# Patient Record
Sex: FEMALE | Race: BLACK OR AFRICAN AMERICAN | NOT HISPANIC OR LATINO | Employment: FULL TIME | URBAN - METROPOLITAN AREA
[De-identification: names, ages, dates, MRNs, and addresses within clinical notes are randomized per-mention and may not be internally consistent; named-entity substitution may affect disease eponyms.]

---

## 2017-02-06 ENCOUNTER — HOSPITAL ENCOUNTER (EMERGENCY)
Facility: HOSPITAL | Age: 52
Discharge: HOME | End: 2017-02-06
Attending: EMERGENCY MEDICINE
Payer: MEDICAID

## 2017-02-06 VITALS
RESPIRATION RATE: 17 BRPM | OXYGEN SATURATION: 99 % | SYSTOLIC BLOOD PRESSURE: 159 MMHG | WEIGHT: 180.25 LBS | HEART RATE: 52 BPM | BODY MASS INDEX: 31.93 KG/M2 | TEMPERATURE: 98 F | DIASTOLIC BLOOD PRESSURE: 71 MMHG

## 2017-02-06 DIAGNOSIS — R10.9 ABDOMINAL DISCOMFORT: ICD-10-CM

## 2017-02-06 DIAGNOSIS — R79.89 ELEVATED TROPONIN: ICD-10-CM

## 2017-02-06 DIAGNOSIS — R11.2 NON-INTRACTABLE VOMITING WITH NAUSEA, UNSPECIFIED VOMITING TYPE: Primary | ICD-10-CM

## 2017-02-06 LAB
ALBUMIN SERPL BCP-MCNC: 3.3 G/DL
ALP SERPL-CCNC: 68 U/L
ALT SERPL W/O P-5'-P-CCNC: 15 U/L
ANION GAP SERPL CALC-SCNC: 9 MMOL/L
AST SERPL-CCNC: 16 U/L
BASOPHILS # BLD AUTO: 0.03 K/UL
BASOPHILS NFR BLD: 0.3 %
BILIRUB SERPL-MCNC: 0.3 MG/DL
BILIRUB UR QL STRIP: NEGATIVE
BUN SERPL-MCNC: 15 MG/DL
CALCIUM SERPL-MCNC: 8.8 MG/DL
CHLORIDE SERPL-SCNC: 108 MMOL/L
CLARITY UR REFRACT.AUTO: CLEAR
CO2 SERPL-SCNC: 25 MMOL/L
COLOR UR AUTO: YELLOW
CREAT SERPL-MCNC: 0.8 MG/DL
DIFFERENTIAL METHOD: ABNORMAL
EOSINOPHIL # BLD AUTO: 0.2 K/UL
EOSINOPHIL NFR BLD: 2 %
ERYTHROCYTE [DISTWIDTH] IN BLOOD BY AUTOMATED COUNT: 14 %
EST. GFR  (AFRICAN AMERICAN): >60 ML/MIN/1.73 M^2
EST. GFR  (NON AFRICAN AMERICAN): >60 ML/MIN/1.73 M^2
GLUCOSE SERPL-MCNC: 80 MG/DL
GLUCOSE UR QL STRIP: NEGATIVE
HCT VFR BLD AUTO: 38.5 %
HGB BLD-MCNC: 12.2 G/DL
HGB UR QL STRIP: ABNORMAL
KETONES UR QL STRIP: NEGATIVE
LEUKOCYTE ESTERASE UR QL STRIP: NEGATIVE
LYMPHOCYTES # BLD AUTO: 3.8 K/UL
LYMPHOCYTES NFR BLD: 42.3 %
MCH RBC QN AUTO: 26 PG
MCHC RBC AUTO-ENTMCNC: 31.7 %
MCV RBC AUTO: 82 FL
MONOCYTES # BLD AUTO: 0.7 K/UL
MONOCYTES NFR BLD: 7.8 %
NEUTROPHILS # BLD AUTO: 4.2 K/UL
NEUTROPHILS NFR BLD: 47.6 %
NITRITE UR QL STRIP: NEGATIVE
PH UR STRIP: 6 [PH] (ref 5–8)
PLATELET # BLD AUTO: 250 K/UL
PMV BLD AUTO: 10.1 FL
POTASSIUM SERPL-SCNC: 4.1 MMOL/L
PROT SERPL-MCNC: 6.9 G/DL
PROT UR QL STRIP: NEGATIVE
RBC # BLD AUTO: 4.69 M/UL
SODIUM SERPL-SCNC: 142 MMOL/L
SP GR UR STRIP: 1.02 (ref 1–1.03)
TROPONIN I SERPL DL<=0.01 NG/ML-MCNC: 0.13 NG/ML
TROPONIN I SERPL DL<=0.01 NG/ML-MCNC: 0.14 NG/ML
URN SPEC COLLECT METH UR: ABNORMAL
UROBILINOGEN UR STRIP-ACNC: <2 EU/DL
WBC # BLD AUTO: 8.94 K/UL

## 2017-02-06 PROCEDURE — 93010 ELECTROCARDIOGRAM REPORT: CPT | Mod: ,,, | Performed by: INTERNAL MEDICINE

## 2017-02-06 PROCEDURE — 93005 ELECTROCARDIOGRAM TRACING: CPT | Performed by: GENERAL PRACTICE

## 2017-02-06 PROCEDURE — 96374 THER/PROPH/DIAG INJ IV PUSH: CPT

## 2017-02-06 PROCEDURE — 81003 URINALYSIS AUTO W/O SCOPE: CPT

## 2017-02-06 PROCEDURE — 96361 HYDRATE IV INFUSION ADD-ON: CPT

## 2017-02-06 PROCEDURE — 85025 COMPLETE CBC W/AUTO DIFF WBC: CPT

## 2017-02-06 PROCEDURE — 84484 ASSAY OF TROPONIN QUANT: CPT | Mod: 91

## 2017-02-06 PROCEDURE — 99284 EMERGENCY DEPT VISIT MOD MDM: CPT | Mod: 25

## 2017-02-06 PROCEDURE — 63600175 PHARM REV CODE 636 W HCPCS: Performed by: NURSE PRACTITIONER

## 2017-02-06 PROCEDURE — 99900035 HC TECH TIME PER 15 MIN (STAT): Performed by: GENERAL PRACTICE

## 2017-02-06 PROCEDURE — 25000003 PHARM REV CODE 250: Performed by: NURSE PRACTITIONER

## 2017-02-06 PROCEDURE — 80053 COMPREHEN METABOLIC PANEL: CPT

## 2017-02-06 RX ORDER — OMEPRAZOLE 20 MG/1
20 CAPSULE, DELAYED RELEASE ORAL DAILY
COMMUNITY
End: 2017-09-14

## 2017-02-06 RX ORDER — ONDANSETRON 2 MG/ML
4 INJECTION INTRAMUSCULAR; INTRAVENOUS
Status: COMPLETED | OUTPATIENT
Start: 2017-02-06 | End: 2017-02-06

## 2017-02-06 RX ORDER — ONDANSETRON 4 MG/1
4 TABLET, ORALLY DISINTEGRATING ORAL EVERY 8 HOURS PRN
Qty: 10 TABLET | Refills: 0 | Status: SHIPPED | OUTPATIENT
Start: 2017-02-06 | End: 2017-09-14

## 2017-02-06 RX ADMIN — ONDANSETRON 4 MG: 2 INJECTION INTRAMUSCULAR; INTRAVENOUS at 03:02

## 2017-02-06 RX ADMIN — SODIUM CHLORIDE 1000 ML: 0.9 INJECTION, SOLUTION INTRAVENOUS at 02:02

## 2017-02-06 NOTE — ED PROVIDER NOTES
Encounter Date: 2017       History     Chief Complaint   Patient presents with    Abdominal Pain     abd pain burning with vomiting since fri.      Review of patient's allergies indicates:  No Known Allergies    Patient is a 51 y.o. female presenting with the following complaint: abdominal pain. The history is provided by the patient.   Abdominal Pain   The current episode started several days ago. The onset of the illness was gradual. The abdominal pain is generalized. The severity of the abdominal pain is 6/10. The other symptoms of the illness include nausea and vomiting. The other symptoms of the illness do not include fever, fatigue, shortness of breath, diarrhea or dysuria.   Nausea began 3 to 5 days ago.   The vomiting began more than 2 days ago. Vomiting occurs 2 to 5 times per day. The emesis contains stomach contents.   The patient states that she believes she is currently not pregnant. Additional symptoms associated with the illness include heartburn. Symptoms associated with the illness do not include chills, constipation, urgency, frequency or back pain. Significant associated medical issues include GERD and cardiac disease.       PCP:              Li Vizcaino MD  Cardiologist:   Darryl Clemente MD      Past Medical History   Diagnosis Date    GERD (gastroesophageal reflux disease)     High cholesterol     Hypertension     MI (myocardial infarction)     Migraine headache     Stroke      No past medical history pertinent negatives.  Past Surgical History   Procedure Laterality Date    Hysterectomy       section       x 2     History reviewed. No pertinent family history.  Social History   Substance Use Topics    Smoking status: Never Smoker    Smokeless tobacco: Never Used    Alcohol use Yes      Comment: occasionally       Review of Systems   Constitutional: Negative for chills, fatigue and fever.   HENT: Negative for congestion and sore throat.    Eyes: Negative for visual  disturbance.   Respiratory: Negative for cough, chest tightness and shortness of breath.    Cardiovascular: Negative for chest pain and palpitations.   Gastrointestinal: Positive for abdominal pain, heartburn, nausea and vomiting. Negative for abdominal distention, constipation and diarrhea.   Genitourinary: Negative for dysuria, frequency and urgency.   Musculoskeletal: Negative for back pain.   Skin: Negative for rash.   Neurological: Negative for dizziness, weakness and headaches.   Hematological: Does not bruise/bleed easily.       Physical Exam   Initial Vitals   BP Pulse Resp Temp SpO2   02/06/17 1306 02/06/17 1306 02/06/17 1306 02/06/17 1306 02/06/17 1306   133/66 55 20 97.5 °F (36.4 °C) 100 %     Physical Exam    Nursing note and vitals reviewed.  Constitutional: She appears well-developed and well-nourished. She is cooperative. She does not appear ill. No distress.   HENT:   Head: Normocephalic and atraumatic.   Nose: Nose normal.   Mouth/Throat: Uvula is midline, oropharynx is clear and moist and mucous membranes are normal.   Eyes: Conjunctivae, EOM and lids are normal. Pupils are equal, round, and reactive to light.   Neck: Trachea normal and normal range of motion. Neck supple.   Cardiovascular: Regular rhythm, intact distal pulses and normal pulses. Bradycardia present.    Pulmonary/Chest: Effort normal and breath sounds normal. No accessory muscle usage. No respiratory distress. She has no wheezes. She has no rhonchi. She has no rales.   Abdominal: Soft. She exhibits no distension and no mass. Bowel sounds are increased. There is tenderness in the epigastric area. There is no rebound and no guarding.   Musculoskeletal: Normal range of motion. She exhibits no edema or tenderness.   Neurological: She is alert and oriented to person, place, and time. She has normal strength. No cranial nerve deficit or sensory deficit. GCS eye subscore is 4. GCS verbal subscore is 5. GCS motor subscore is 6.    Neurovascular intact to all extremities. Normal gait.    Skin: Skin is warm, dry and intact. No rash noted.   Normal color and turgor.    Psychiatric: She has a normal mood and affect. Her speech is normal and behavior is normal. Judgment and thought content normal. Cognition and memory are normal.         ED Course   Procedures    ED Lab Results:   Results for orders placed or performed during the hospital encounter of 02/06/17   CBC W/ AUTO DIFFERENTIAL   Result Value Ref Range    WBC 8.94 3.90 - 12.70 K/uL    RBC 4.69 4.00 - 5.40 M/uL    Hemoglobin 12.2 12.0 - 16.0 g/dL    Hematocrit 38.5 37.0 - 48.5 %    MCV 82 82 - 98 fL    MCH 26.0 (L) 27.0 - 31.0 pg    MCHC 31.7 (L) 32.0 - 36.0 %    RDW 14.0 11.5 - 14.5 %    Platelets 250 150 - 350 K/uL    MPV 10.1 9.2 - 12.9 fL    Gran # 4.2 1.8 - 7.7 K/uL    Lymph # 3.8 1.0 - 4.8 K/uL    Mono # 0.7 0.3 - 1.0 K/uL    Eos # 0.2 0.0 - 0.5 K/uL    Baso # 0.03 0.00 - 0.20 K/uL    Gran% 47.6 38.0 - 73.0 %    Lymph% 42.3 18.0 - 48.0 %    Mono% 7.8 4.0 - 15.0 %    Eosinophil% 2.0 0.0 - 8.0 %    Basophil% 0.3 0.0 - 1.9 %    Differential Method Automated    Comp. Metabolic Panel   Result Value Ref Range    Sodium 142 136 - 145 mmol/L    Potassium 4.1 3.5 - 5.1 mmol/L    Chloride 108 95 - 110 mmol/L    CO2 25 23 - 29 mmol/L    Glucose 80 70 - 110 mg/dL    BUN, Bld 15 6 - 20 mg/dL    Creatinine 0.8 0.5 - 1.4 mg/dL    Calcium 8.8 8.7 - 10.5 mg/dL    Total Protein 6.9 6.0 - 8.4 g/dL    Albumin 3.3 (L) 3.5 - 5.2 g/dL    Total Bilirubin 0.3 0.1 - 1.0 mg/dL    Alkaline Phosphatase 68 55 - 135 U/L    AST 16 10 - 40 U/L    ALT 15 10 - 44 U/L    Anion Gap 9 8 - 16 mmol/L    eGFR if African American >60.0 >60 mL/min/1.73 m^2    eGFR if non African American >60.0 >60 mL/min/1.73 m^2   Urinalysis - Clean Catch   Result Value Ref Range    Specimen UA Urine, Clean Catch     Color, UA Yellow Yellow, Straw, Rachel    Appearance, UA Clear Clear    pH, UA 6.0 5.0 - 8.0    Specific Gravity, UA 1.025  1.005 - 1.030    Protein, UA Negative Negative    Glucose, UA Negative Negative    Ketones, UA Negative Negative    Bilirubin (UA) Negative Negative    Occult Blood UA Trace (A) Negative    Nitrite, UA Negative Negative    Urobilinogen, UA <2.0 <2.0 EU/dL    Leukocytes, UA Negative Negative   Troponin I   Result Value Ref Range    Troponin I 0.126 (H) 0.000 - 0.026 ng/mL   Troponin I   Result Value Ref Range    Troponin I 0.137 (H) 0.000 - 0.026 ng/mL       ED Medications:   Medications   sodium chloride 0.9% bolus 1,000 mL (0 mLs Intravenous Stopped 2/6/17 1700)   ondansetron injection 4 mg (4 mg Intravenous Given 2/6/17 1501)       ED Course Vitals  Vitals:    02/06/17 1306 02/06/17 1722 02/06/17 1931   BP: 133/66 131/68 (!) 159/71   BP Location:  Left arm Left arm   Patient Position:  Sitting Sitting   BP Method:  Automatic Automatic   Pulse: (!) 55 (!) 56 (!) 52   Resp: 20 16 17   Temp: 97.5 °F (36.4 °C) 98.1 °F (36.7 °C) 98.3 °F (36.8 °C)   TempSrc: Oral Oral Oral   SpO2: 100% 100% 99%   Weight: 81.8 kg (180 lb 4 oz)         1530 HOURS RE-EVALUATION: The patient is resting comfortably and in NAD. She states that her nausea has resolved and that her pain is resolving.  She rates her current level of pain as a 2/10.  Updated patient on case.  Answered questions at this time.      1910 HOURS CARDIOLOGY CONSULT: Discussed case with Dr. Jordon Wolf (on call for Dr. Darryl Clemente), who agrees with the treatment plan and recommends that the patient follow up with Dr. Clemente this week for further evaluation and treatment.  He states that her last cardiac workup was completely negative and has no concern for the elevated troponin at this time.       1925 HOURS RE-EVALUATION & DISPOSITION:   Reassessment at the time of disposition demonstrates that the patient is resting comfortably in no acute distress.  She has remained hemodynamically stable throughout the entire ED visit and is without objective evidence for acute  process requiring urgent intervention or hospitalization. I discussed test results and provided counseling to patient with regard to condition, the treatment plan, specific conditions for return, and the importance of follow up.  Answered questions at this time. The patient is stable for discharge.         EKG Readings: (Independently Interpreted)   Initial Reading: No STEMI. Rhythm: Sinus Bradycardia. Heart Rate: 52. Ectopy: No Ectopy. Conduction: Normal. ST Segments: Normal ST Segments. T Waves: Normal. Axis: Normal. Clinical Impression: Sinus Bradycardia          Medical Decision Making:   History:   Old Records Summarized: records from clinic visits.  Clinical Tests:   Lab Tests: Ordered and Reviewed  Medical Tests: Ordered and Reviewed  Other:   I have discussed this case with another health care provider.       <> Summary of the Discussion: Attending Physician:   Mary Ellen Reyes DO  Cardiologist:   Jordon Wolf MD (on call for Dr. Darryl Clemente)                     Clinical Impression:       ICD-10-CM ICD-9-CM   1. Non-intractable vomiting with nausea, unspecified vomiting type R11.2 787.01   2. Abdominal discomfort R10.9 789.00   3. Elevated troponin R79.89 790.6         Disposition:   Disposition: Discharged  Condition: Stable  I discussed with patient that the evaluation in the emergency department does not suggest any emergent or life threatening medical condition requiring immediate intervention beyond what was provided in the ED, and I believe patient is safe for discharge.  Regardless, an unremarkable evaluation in the ED does not preclude the development or presence of a serious of life threatening condition. As such, patient was instructed to return immediately for any worsening or change in current symptoms. I also discussed the results of my evaluation and diagnosis with patient and she concurs with the evaluation and management plan.  Detailed written and verbal instructions provided to patient  and she expressed a verbal understanding of the discharge instructions and overall management plan. Reiterated the importance of medication administration and safety and advised patient to follow up with primary care provider in 3-5 days or sooner if needed.  Also advised patient to return to the ER for any complications.     Regarding ABDOMINAL PAIN, I recommended that the patient: Sip water or other clear fluids; avoid solid food for the first few hours after vomiting or diarrhea; if vomiting, wait 6 hours, and then eat small amounts of mild foods such as rice, applesauce, or crackers; avoid dairy products; avoid citrus, high-fat foods, fried or greasy foods, tomato products, caffeine, alcohol, and carbonated beverages;  avoid aspirin, ibuprofen or other anti-inflammatory medications, and narcotic pain medications unless prescribed.  In regards to prevention, I encouraged patient to:  Avoid fatty or greasy foods; drink plenty of water each day; eat small meals more frequently; exercise regularly; limit foods that produce gas; make sure meals are well-balanced and high in fiber and include plenty of fruits and vegetables.    For NAUSEA/VOMITING, I advised patient on importance of staying well hydrated; eating frequent, small amounts of clear liquids; avoid solid foods until there has been no vomiting for six hours, and then work slowly back to a normal diet; and to use an OTC bismuth stomach remedy for upset stomach, nausea, indigestion, and diarrhea. We discussed signs and symptoms of dehydration and possible causes of N/V with patient (viral infections, medications, migraine headaches, food poisoning, allergies, and peptic ulcer disease).  Reiterated the importance of following up with primary care provider if no improvement is noted within 72 hours.       Discharge Medication List as of 2/6/2017  7:23 PM      START taking these medications    Details   ondansetron (ZOFRAN-ODT) 4 MG TbDL Take 1 tablet (4 mg  total) by mouth every 8 (eight) hours as needed., Starting 2/6/2017, Until Discontinued, Normal             Follow-up Information     Schedule an appointment as soon as possible for a visit with Li Vizcaino MD.    Specialty:  Internal Medicine    Why:  If symptoms worsen or as needed    Contact information:    [address] [facility] [id number] [phone]          Schedule an appointment as soon as possible for a visit with Darryl Clemente MD.    Specialty:  Cardiology    Why:  You should expect a call tomorrow to schedule an appointment as soon as possible for a follow up visit             Zaid Santacruz NP  02/18/17 [id number]

## 2017-02-06 NOTE — ED AVS SNAPSHOT
[facility]  [address] [address] [address]               Scooter Ochoa   2017  1:53 PM   ED    Description:  Female : 1965   Department:  [facility]           Your Care was Coordinated By:     Provider Role From To    Zaid Santacruz NP Nurse Practitioner [phone] --      Reason for Visit     Abdominal Pain           Diagnoses this Visit        Comments    Non-intractable vomiting with nausea, unspecified vomiting type    -  Primary     Abdominal discomfort         Elevated troponin           ED Disposition     ED Disposition Condition Comment    Discharge  1)  Follow up with Dr. Darryl Clemente this week (you should receive a phone call from his office to have an appointment scheduled).      2)  Your prescription for Zofran was sent electronically to [facility].            To Do List           Follow-up Information     Schedule an appointment as soon as possible for a visit with Li Vizcaino MD.    Specialty:  Internal Medicine    Why:  If symptoms worsen or as needed    Contact information:    [address] [place] [id number] [phone]          Schedule an appointment as soon as possible for a visit with Darryl Clemente MD.    Specialty:  Cardiology    Why:  You should expect a call tomorrow to schedule an appointment as soon as possible for a follow up visit       These Medications        Disp Refills Start End    ondansetron (ZOFRAN-ODT) 4 MG TbDL 10 tablet 0 2017     Take 1 tablet (4 mg total) by mouth every 8 (eight) hours as needed. - Oral    Pharmacy: [facility] [address] [place] [address] [id number] [place] [address] Ph #: [phone]         [facility]     [facility] -  Assistance  Registered nurses in the [facility] provide clinical advisement, health education, appointment booking, and other advisory services.  Call for this free service at [phone].              Medications           Message regarding Medications     Verify the changes and/or additions to your medication regime listed below are the same as discussed with your clinician today.  If any of these changes or additions are incorrect, please notify your healthcare provider.        START taking these NEW medications        Refills    ondansetron (ZOFRAN-ODT) 4 MG TbDL 0    Sig: Take 1 tablet (4 mg total) by mouth every 8 (eight) hours as needed.    Class: Normal    Route: Oral      These medications were administered today        Dose Freq    sodium chloride 0.9% bolus 1,000 mL 1,000 mL Once    Sig: Inject 1,000 mLs into the vein once.    Class: Normal    Route: Intravenous    ondansetron injection 4 mg 4 mg ED 1 Time    Sig: Inject 4 mg into the vein ED 1 Time.    Class: Normal    Route: Intravenous      STOP taking these medications     levothyroxine (SYNTHROID) 75 MCG tablet Take 75 mcg by mouth once daily.    pantoprazole (PROTONIX) 40 MG tablet Take 40 mg by mouth once daily.    citalopram (CELEXA) 20 MG tablet Take 20 mg by mouth once daily.    metoprolol tartrate (LOPRESSOR) 25 MG tablet Take 0.5 tablets (12.5 mg total) by mouth 2 (two) times daily.    mupirocin (BACTROBAN) 2 % ointment Apply topically to affected area three times daily.    naproxen (NAPROSYN) 375 MG tablet Take 1 tablet (375 mg total) by mouth 2 (two) times daily as needed (Pain).           Verify that the below list of medications is an accurate representation of the medications you are currently taking.  If none reported, the list may be blank. If incorrect, please contact your healthcare provider. Carry this list with you in case of emergency.           Current Medications     aspirin (ECOTRIN) 81 MG EC tablet Take 1 tablet (81 mg total) by mouth once daily.    atorvastatin (LIPITOR) 20 MG tablet Take 1 tablet (20 mg total) by mouth once daily.    lisinopril 10 MG tablet Take 10 mg by mouth once daily.    omeprazole (PRILOSEC) 20 MG  capsule Take 20 mg by mouth once daily.    CALCIUM CARBONATE/VITAMIN D3 (VITAMIN D-3 ORAL) Take by mouth.    cyanocobalamin, vitamin B-12, 50 mcg tablet Take by mouth.    ondansetron (ZOFRAN-ODT) 4 MG TbDL Take 1 tablet (4 mg total) by mouth every 8 (eight) hours as needed.           Clinical Reference Information           Your Vitals Were     BP Pulse Temp Resp Weight SpO2    131/68 (BP Location: Left arm, Patient Position: Sitting, BP Method: Automatic) 56 98.1 °F (36.7 °C) (Oral) 16 81.8 kg (180 lb 4 oz) 100%    BMI                31.93 kg/m2          Allergies as of 2/6/2017     No Known Allergies      Immunizations Administered on Date of Encounter - 2/6/2017     None      ED Micro, Lab, POCT     Start Ordered       Status Ordering Provider    02/06/17 1819 02/06/17 1819  Troponin I  STAT     Comments:  Repeat Troponin    Final result     02/06/17 1444 02/06/17 1443  Troponin I  STAT      Final result     02/06/17 1437 02/06/17 1437  CBC W/ AUTO DIFFERENTIAL  STAT      Final result     02/06/17 1437 02/06/17 1437  Comp. Metabolic Panel  STAT      Final result     02/06/17 1437 02/06/17 1437  Urinalysis - Clean Catch  STAT      Final result       ED Imaging Orders     None        Discharge Instructions         Abdominal Pain  Abdominal pain is pain in the stomach or belly area. Everyone has this pain from time to time. In many cases it goes away on its own. But abdominal pain can sometimes be due to a serious problem, such as appendicitis. So its important to know when to seek help.  Causes of abdominal pain  There are many possible causes of abdominal pain. Common causes in adults include:  · Constipation, diarrhea, or gas  · Stomach acid flowing back up into the esophagus (acid reflux or heartburn)  · Severe acid reflux, called GERD (gastroesophageal reflux disease)  · A sore in the lining of the stomach or small intestine (peptic ulcer)  · Inflammation of the gallbladder, liver, or pancreas  · Gallstones or  kidney stones  · Appendicitis   · Intestinal blockage   · An internal organ pushing through a muscle or other tissue (hernia)  · Urinary tract infections  · In women, menstrual cramps, fibroids, or endometriosis  · Inflammation or infection of the intestines  Diagnosing the cause of abdominal pain  Your healthcare provider will do a physical exam help find the cause of your pain. If needed, tests will be ordered. Belly pain has many possible causes. So it can be hard to find the reason for your pain. Giving details about your pain can help. Tell your provider where and when you feel the pain, and what makes it better or worse. Also let your provider know if you have other symptoms such as:  · Fever  · Tiredness  · Upset stomach (nausea)  · Vomiting  · Changes in bathroom habits  Treating abdominal pain  Some causes of pain need emergency medical treatment right away. These include appendicitis or a bowel blockage. Other problems can be treated with rest, fluids, or medicines. Your healthcare provider can give you specific instructions for treatment or self-care based on what is causing your pain.  If you have vomiting or diarrhea, sip water or other clear fluids. When you are ready to eat solid foods again, start with small amounts of easy-to-digest, low-fat foods. These include apple sauce, toast, or crackers.   When to seek medical care  Call 911 or go to the hospital right away if you:  · Cant pass stool and are vomiting  · Are vomiting blood or have bloody diarrhea or black, tarry diarrhea  · Have chest, neck, or shoulder pain  · Feel like you might pass out  · Have pain in your shoulder blades with nausea  · Have sudden, severe belly pain  · Have new, severe pain unlike any you have felt before  · Have a belly that is rigid, hard, and tender to touch  Call your healthcare provider if you have:  · Pain for more than 5 days  · Bloating for more than 2 days  · Diarrhea for more than 5 days  · A fever of 100.4°F  "(38.0°C) or higher, or as directed by your provider  · Pain that gets worse  · Weight loss for no reason  · Continued lack of appetite  · Blood in your stool  How to prevent abdominal pain  Here are some tips to help prevent abdominal pain:  · Eat smaller amounts of food at one time.  · Avoid greasy, fried, or other high-fat foods.  · Avoid foods that give you gas.  · Exercise regularly.  · Drink plenty of fluids.  To help prevent GERD symptoms:  · Quit smoking.  · Reduce alcohol and certain foods that increase stomach acid.  · Avoid aspirin and over-the-counter pain and fever medicines (NSAIDS or nonsteroidal anti-inflammatory drugs), if possible  · Lose extra weight.  · Finish eating at least 2 hours before you go to bed or lie down.  · Raise the head of your bed.          Vomiting (Adult)  Vomiting is a common symptom that may be due to different causes. These include gastroenteritis ("stomach flu"), food poisoning and gastritis. There are other more serious causes of vomiting which may be hard to diagnose early in the illness. Therefore, it is important to watch for the warning signs listed below.  The main danger from repeated vomiting is dehydration. This is due to excess loss of water and minerals from the body. When this occurs, body fluids must be replaced.  Home care  · If symptoms are severe, rest at home for the next 24 hours.  · Because your symptoms may be from an infection, wash your hands frequently and well, and use alcohol-based  to avoid spreading the infection to others.  · Wash your hands for at least 20 seconds. Hum the happy birthday song twice for the correct length of time.  · Wash your hands after using the toilet, before and after preparing food, before eating food, after changing a diaper, cleaning a wound, caring for a sick person, and blowing your nose, coughing, or sneezing. You should also wash your hands after caring for someone who is sick, touching pet food, or treats, and " touching an animal, or animal waste.  · You may use acetaminophen or NSAID medicines like ibuprofen or naproxen to control fever, unless another medicine was prescribed. If you have chronic liver or kidney disease or ever had a stomach ulcer or GI bleeding, talk with your doctor before using these medicines. Aspirin should never be used in anyone under 18 years of age who is ill with a fever. It may cause severe liver damage. Don't use NSAID medicines if you are already taking one for another condition (like arthritis) or are on aspirin (such as for heart disease, or after a stroke)  · Avoid tobacco and alcohol use, which may worsen your symptoms.  · If medicines for vomiting were prescribed, take as directed.  · Once vomiting stops, then follow these guidelines:  During the first 12 to 24 hours follow the diet below:  · Fruit juices. Apple, grape juice, clear fruit drinks, and electrolyte replacement drinks.  · Beverages. Soft drinks without caffeine; mineral water (plain or flavored), decaffeinated tea and coffee.  · Soups. Clear broth, consommé and bouillon  · Desserts. Plain gelatin, popsicles and fruit juice bars. As you feel better, you may add 6-8 ounces of yogurt per day.  During the next 24 hours you may add the following to the above:  · Hot cereal, plain toast, bread, rolls, crackers  · Plain noodles, rice, mashed potatoes, chicken noodle or rice soup  · Unsweetened canned fruit (avoid pineapple), bananas  · Limit caffeine and chocolate. No spices or seasonings except salt.  During the next 24 hours:  Gradually resume a normal diet, as you feel better and your symptoms lessen.  Follow-up care  Follow up with your healthcare provider, or as advised.  When to seek medical advice  Call your healthcare provider right away if any of these occur:  · Constant right-sided lower abdominal pain or increasing general abdominal pain  · Continued vomiting (unable to keep liquids down) for 24 hours  · Frequent diarrhea  (more than 5 times a day); blood (red or black color) or mucus in diarrhea  · Reduced urine output or extreme thirst  · Weakness, dizziness or fainting  · Unusually drowsy or confused  · Fever of 100.4°F (38°C) oral or higher, or as directed  · Yellow color of the eyes or skin        Troponin  Does this test have other names?  Cardiac troponin (cTn), cardiac troponin I (cTnI), cardiac troponin T (cTnT)  What is this test?  This test measures the amount of the protein troponin in your blood.  Troponin is found in cells in your heart muscle. When these cells are injured--most often because the heart isn't getting enough oxygen and nutrients--they can release troponin and other substances into the blood.  Measuring your levels of troponin often can quickly tell your healthcare provider whether you are having a heart attack. During a heart attack, an artery that feeds your heart muscle with blood becomes blocked.  Why do I need this test?  You might have this test if your healthcare provider suspects that you are having a heart attack. Symptoms of a heart attack often include:  · Pain or discomfort in the chest that may feel like a squeezing sensation or a sense of fullness  · Pain in other areas, such as the neck, back, arm, or jaw  · Shortness of breath  · Lightheadedness or dizziness  · Nausea or vomiting  · Sudden sweating  · Extreme tiredness  What other tests might I have along with this test?  Your healthcare provider may also order other tests to diagnose a heart attack and learn more about how it's affecting the heart. These tests often include:  · Electrocardiogram (ECG) to measure the heart's electrical activity  · Blood tests to measure creatine kinase MB, a substance found in heart muscle and other tissues  What do my test results mean?  Many things may affect your lab test results. These include the method each lab uses to do the test. Even if your test results are different from the normal value, you may  not have a problem. To learn what the results mean for you, talk with your healthcare provider.  Results are given in nanograms per milliliter (ng/mL). The normal range for troponin is between 0 and 0.4 ng/mL.  Other types of heart injury may cause a rise in troponin levels. These include:  · Atrial fibrillation  · Heart failure  · Myocarditis  · Damage to the heart from anthracycline medicines. These are used for cancer treatment.  Conditions in other parts of your body may cause troponin levels to rise. These include:  · Blood clot in your lungs (pulmonary embolism)  · Chronic kidney disease  · Chronic obstructive pulmonary disease (COPD)  How is this test done?  The test requires a blood sample, which is drawn through a needle from a vein in your arm.  Does this test pose any risks?  Taking a blood sample with a needle carries risks that include bleeding, infection, bruising, or feeling dizzy. When the needle pricks your arm, you may feel a slight stinging sensation or pain. Afterward, the site may be slightly sore.  What might affect my test results?  Having this test too soon after a heart attack may give a false-negative. Cardiac troponin takes a few hours to rise after heart-cell death begins. Your healthcare provider may need to measure it several times over a few hours after the symptoms start.      [facility] Sign-Up     Activating your [facility] account is as easy as 1-2-3!     1) Visit [email or web address], select Sign Up Now, enter this activation code and your date of birth, then select Next.  [email or web address]  Expires: 3/23/2017  7:22 PM      2) Create a username and password to use when you visit [facility] in the future and select a security question in case you lose your password and select Next.    3) Enter your e-mail address and click Sign Up!    Additional Information  If you have questions, please e-mail [email or web address] or call [phone] to talk to our [facility] staff. Remember, [facility] is  NOT to be used for urgent needs. For medical emergencies, dial 911.          [facility] complies with applicable Federal civil rights laws and does not discriminate on the basis of race, color, national origin, age, disability, or sex.        Language Assistance Services     ATTENTION: Language assistance services are available, free of charge. Please call [phone].      ATENCIÓN: Si habla español, tiene a johnson disposición servicios gratuitos de asistencia lingüística. Llame al [phone].     CHÚ Ý: N?u b?n nói Ti?ng Vi?t, có các d?ch v? h? tr? ngôn ng? mi?n phí dành cho b?n. G?i s? [phone].        Medications Administered     ondansetron injection 4 mg                  sodium chloride 0.9% bolus 1,000 mL                    Administrations This Visit        Admin Date Action                   ondansetron injection 4 mg 02/06/2017 Given                   Admin Date Action                   sodium chloride 0.9% bolus 1,000 mL 02/06/2017 New Bag                  Administrations This Visit     ondansetron injection 4 mg     Admin Date Action Dose Route Administered By             02/06/2017 Given 4 mg Intravenous Flavia Ho, RN                    sodium chloride 0.9% bolus 1,000 mL     Admin Date Action Dose Route Administered By             02/06/2017 New Bag 1000 mL Intravenous Flavia Ho, RN

## 2017-02-06 NOTE — Clinical Note
Follow up with Dr. Darryl Clemente this week (you should receive a phone call from his office to have an appointment scheduled).

## 2017-02-06 NOTE — ED NOTES
PT updated on plan of care. Lying in stretcher, NAD. No complaints or concerns at this time. Will continue to monitor.

## 2017-02-07 NOTE — ED NOTES
Level of Consciousness: Patient is awake, alert, oriented to person, place, time, and situation.    Appearance: Pt resting comfortably in stretcher, no acute distress at this time. Clothing appropriately placed and clean. Hygiene is appropriate.   Skin: Skin is warm, dry, and intact. Skin turgor is normal/elastic. Mucous membranes moist. Skin color is normal for ethnicity. No skin breakdown noted.  Musculoskeletal: Moves all extremities well. Full active ROM. No deformities noted. Denies any weakness. Gait steady, ambulates without use of assistive devices.   Respiratory: Airway open and patent. Respirations equal and unlabored. Breath sounds clear to auscultation. Denies any SOB.   Cardiac: Bradycardic. No peripheral edema noted. Radial and pedal pulses present and normal. Capillary refill is within normal limits. Denies chest pain.    GI: Denies any abdominal pain at this time.  Abdomen soft, non-tender to all quadrants with palpitation. Bowel sounds present and active in all quads. Abdomen symmetric with no distention noted. Denies any N/V/D at this time. No episodes of emesis since arrival to ED.    Neurological: Symmetrical expressions noted to face. Equal bilateral . Normal sensation reported to all extremities. No obvious neurological deficits noted.   Psychosocial: Speech spontaneous, clear, and coherent. Appropriate to situation. Pt is calm and cooperative.     Pt informed of plan of care, verbalizes understanding, and denies any other questions, complaints, or concerns at this time. Bed in locked in lowest position, siderails up x2, call light within reach. Will continue to monitor.

## 2017-02-07 NOTE — DISCHARGE INSTRUCTIONS
Abdominal Pain  Abdominal pain is pain in the stomach or belly area. Everyone has this pain from time to time. In many cases it goes away on its own. But abdominal pain can sometimes be due to a serious problem, such as appendicitis. So its important to know when to seek help.  Causes of abdominal pain  There are many possible causes of abdominal pain. Common causes in adults include:  · Constipation, diarrhea, or gas  · Stomach acid flowing back up into the esophagus (acid reflux or heartburn)  · Severe acid reflux, called GERD (gastroesophageal reflux disease)  · A sore in the lining of the stomach or small intestine (peptic ulcer)  · Inflammation of the gallbladder, liver, or pancreas  · Gallstones or kidney stones  · Appendicitis   · Intestinal blockage   · An internal organ pushing through a muscle or other tissue (hernia)  · Urinary tract infections  · In women, menstrual cramps, fibroids, or endometriosis  · Inflammation or infection of the intestines  Diagnosing the cause of abdominal pain  Your healthcare provider will do a physical exam help find the cause of your pain. If needed, tests will be ordered. Belly pain has many possible causes. So it can be hard to find the reason for your pain. Giving details about your pain can help. Tell your provider where and when you feel the pain, and what makes it better or worse. Also let your provider know if you have other symptoms such as:  · Fever  · Tiredness  · Upset stomach (nausea)  · Vomiting  · Changes in bathroom habits  Treating abdominal pain  Some causes of pain need emergency medical treatment right away. These include appendicitis or a bowel blockage. Other problems can be treated with rest, fluids, or medicines. Your healthcare provider can give you specific instructions for treatment or self-care based on what is causing your pain.  If you have vomiting or diarrhea, sip water or other clear fluids. When you are ready to eat solid foods again, start  "with small amounts of easy-to-digest, low-fat foods. These include apple sauce, toast, or crackers.   When to seek medical care  Call 911 or go to the hospital right away if you:  · Cant pass stool and are vomiting  · Are vomiting blood or have bloody diarrhea or black, tarry diarrhea  · Have chest, neck, or shoulder pain  · Feel like you might pass out  · Have pain in your shoulder blades with nausea  · Have sudden, severe belly pain  · Have new, severe pain unlike any you have felt before  · Have a belly that is rigid, hard, and tender to touch  Call your healthcare provider if you have:  · Pain for more than 5 days  · Bloating for more than 2 days  · Diarrhea for more than 5 days  · A fever of 100.4°F (38.0°C) or higher, or as directed by your provider  · Pain that gets worse  · Weight loss for no reason  · Continued lack of appetite  · Blood in your stool  How to prevent abdominal pain  Here are some tips to help prevent abdominal pain:  · Eat smaller amounts of food at one time.  · Avoid greasy, fried, or other high-fat foods.  · Avoid foods that give you gas.  · Exercise regularly.  · Drink plenty of fluids.  To help prevent GERD symptoms:  · Quit smoking.  · Reduce alcohol and certain foods that increase stomach acid.  · Avoid aspirin and over-the-counter pain and fever medicines (NSAIDS or nonsteroidal anti-inflammatory drugs), if possible  · Lose extra weight.  · Finish eating at least 2 hours before you go to bed or lie down.  · Raise the head of your bed.          Vomiting (Adult)  Vomiting is a common symptom that may be due to different causes. These include gastroenteritis ("stomach flu"), food poisoning and gastritis. There are other more serious causes of vomiting which may be hard to diagnose early in the illness. Therefore, it is important to watch for the warning signs listed below.  The main danger from repeated vomiting is dehydration. This is due to excess loss of water and minerals from the " body. When this occurs, body fluids must be replaced.  Home care  · If symptoms are severe, rest at home for the next 24 hours.  · Because your symptoms may be from an infection, wash your hands frequently and well, and use alcohol-based  to avoid spreading the infection to others.  · Wash your hands for at least 20 seconds. Hum the happy birthday song twice for the correct length of time.  · Wash your hands after using the toilet, before and after preparing food, before eating food, after changing a diaper, cleaning a wound, caring for a sick person, and blowing your nose, coughing, or sneezing. You should also wash your hands after caring for someone who is sick, touching pet food, or treats, and touching an animal, or animal waste.  · You may use acetaminophen or NSAID medicines like ibuprofen or naproxen to control fever, unless another medicine was prescribed. If you have chronic liver or kidney disease or ever had a stomach ulcer or GI bleeding, talk with your doctor before using these medicines. Aspirin should never be used in anyone under 18 years of age who is ill with a fever. It may cause severe liver damage. Don't use NSAID medicines if you are already taking one for another condition (like arthritis) or are on aspirin (such as for heart disease, or after a stroke)  · Avoid tobacco and alcohol use, which may worsen your symptoms.  · If medicines for vomiting were prescribed, take as directed.  · Once vomiting stops, then follow these guidelines:  During the first 12 to 24 hours follow the diet below:  · Fruit juices. Apple, grape juice, clear fruit drinks, and electrolyte replacement drinks.  · Beverages. Soft drinks without caffeine; mineral water (plain or flavored), decaffeinated tea and coffee.  · Soups. Clear broth, consommé and bouillon  · Desserts. Plain gelatin, popsicles and fruit juice bars. As you feel better, you may add 6-8 ounces of yogurt per day.  During the next 24 hours you may  add the following to the above:  · Hot cereal, plain toast, bread, rolls, crackers  · Plain noodles, rice, mashed potatoes, chicken noodle or rice soup  · Unsweetened canned fruit (avoid pineapple), bananas  · Limit caffeine and chocolate. No spices or seasonings except salt.  During the next 24 hours:  Gradually resume a normal diet, as you feel better and your symptoms lessen.  Follow-up care  Follow up with your healthcare provider, or as advised.  When to seek medical advice  Call your healthcare provider right away if any of these occur:  · Constant right-sided lower abdominal pain or increasing general abdominal pain  · Continued vomiting (unable to keep liquids down) for 24 hours  · Frequent diarrhea (more than 5 times a day); blood (red or black color) or mucus in diarrhea  · Reduced urine output or extreme thirst  · Weakness, dizziness or fainting  · Unusually drowsy or confused  · Fever of 100.4°F (38°C) oral or higher, or as directed  · Yellow color of the eyes or skin        Troponin  Does this test have other names?  Cardiac troponin (cTn), cardiac troponin I (cTnI), cardiac troponin T (cTnT)  What is this test?  This test measures the amount of the protein troponin in your blood.  Troponin is found in cells in your heart muscle. When these cells are injured--most often because the heart isn't getting enough oxygen and nutrients--they can release troponin and other substances into the blood.  Measuring your levels of troponin often can quickly tell your healthcare provider whether you are having a heart attack. During a heart attack, an artery that feeds your heart muscle with blood becomes blocked.  Why do I need this test?  You might have this test if your healthcare provider suspects that you are having a heart attack. Symptoms of a heart attack often include:  · Pain or discomfort in the chest that may feel like a squeezing sensation or a sense of fullness  · Pain in other areas, such as the neck,  back, arm, or jaw  · Shortness of breath  · Lightheadedness or dizziness  · Nausea or vomiting  · Sudden sweating  · Extreme tiredness  What other tests might I have along with this test?  Your healthcare provider may also order other tests to diagnose a heart attack and learn more about how it's affecting the heart. These tests often include:  · Electrocardiogram (ECG) to measure the heart's electrical activity  · Blood tests to measure creatine kinase MB, a substance found in heart muscle and other tissues  What do my test results mean?  Many things may affect your lab test results. These include the method each lab uses to do the test. Even if your test results are different from the normal value, you may not have a problem. To learn what the results mean for you, talk with your healthcare provider.  Results are given in nanograms per milliliter (ng/mL). The normal range for troponin is between 0 and 0.4 ng/mL.  Other types of heart injury may cause a rise in troponin levels. These include:  · Atrial fibrillation  · Heart failure  · Myocarditis  · Damage to the heart from anthracycline medicines. These are used for cancer treatment.  Conditions in other parts of your body may cause troponin levels to rise. These include:  · Blood clot in your lungs (pulmonary embolism)  · Chronic kidney disease  · Chronic obstructive pulmonary disease (COPD)  How is this test done?  The test requires a blood sample, which is drawn through a needle from a vein in your arm.  Does this test pose any risks?  Taking a blood sample with a needle carries risks that include bleeding, infection, bruising, or feeling dizzy. When the needle pricks your arm, you may feel a slight stinging sensation or pain. Afterward, the site may be slightly sore.  What might affect my test results?  Having this test too soon after a heart attack may give a false-negative. Cardiac troponin takes a few hours to rise after heart-cell death begins. Your  healthcare provider may need to measure it several times over a few hours after the symptoms start.

## 2017-09-14 ENCOUNTER — HOSPITAL ENCOUNTER (EMERGENCY)
Facility: HOSPITAL | Age: 52
Discharge: HOME | End: 2017-09-14
Payer: MEDICAID

## 2017-09-14 VITALS
WEIGHT: 183 LBS | DIASTOLIC BLOOD PRESSURE: 70 MMHG | RESPIRATION RATE: 20 BRPM | BODY MASS INDEX: 32.42 KG/M2 | OXYGEN SATURATION: 98 % | HEART RATE: 59 BPM | TEMPERATURE: 98 F | SYSTOLIC BLOOD PRESSURE: 149 MMHG

## 2017-09-14 DIAGNOSIS — M79.605 PAIN OF LEFT LOWER EXTREMITY: ICD-10-CM
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
## 2017-09-14 DIAGNOSIS — S86.912A MUSCLE STRAIN OF LOWER EXTREMITY, LEFT, INITIAL ENCOUNTER: Primary | ICD-10-CM

## 2017-09-14 PROCEDURE — 99283 EMERGENCY DEPT VISIT LOW MDM: CPT

## 2017-09-14 RX ORDER — CITALOPRAM 20 MG/1
20 TABLET, FILM COATED ORAL DAILY
COMMUNITY

## 2017-09-15 NOTE — ED NOTES
No swelling, deformity, erythema noted to posterior left thigh.  Denies increase in pain with palpation. Physical exam as per provider.

## 2017-09-15 NOTE — ED PROVIDER NOTES
History      Chief Complaint   Patient presents with    Fall     x 1 day ago; now has pain to left posterior thigh; denies LOC; currently ambulating with limp       Review of patient's allergies indicates:  No Known Allergies     HPI   HPI    2017, 7:39 PM   History obtained from the patient      History of Present Illness: Scooter Ochoa is a 51 y.o. female patient who presents to the Emergency Department for left leg pain following a fall one day ago.  Patient states that she fell in bathtub.  Rates pain 1-2/10.  No treatments tried.  Denies fever, vomiting, diarrhea, sore throat, cough, otalgia.        Arrival mode: Personal vehicle    PCP: Rosita Mcgrath MD       Past Medical History:  Past Medical History:   Diagnosis Date    GERD (gastroesophageal reflux disease)     High cholesterol     Hypertension     MI (myocardial infarction)     Stroke        Past Surgical History:  Past Surgical History:   Procedure Laterality Date    ANGIOPLASTY       SECTION      x 2    HYSTERECTOMY           Family History:  History reviewed. No pertinent family history.    Social History:  Social History     Social History Main Topics    Smoking status: Never Smoker    Smokeless tobacco: Never Used    Alcohol use Yes      Comment: occasionally    Drug use: No    Sexual activity: Yes     Partners: Male       ROS   Review of Systems   Constitutional: Negative for chills and fever.   HENT: Negative for congestion and rhinorrhea.    Respiratory: Negative for cough and wheezing.    Gastrointestinal: Negative for diarrhea and vomiting.   Musculoskeletal: Positive for arthralgias and myalgias.       Physical Exam      Initial Vitals [17]   BP Pulse Resp Temp SpO2   (!) 149/70 (!) 59 20 97.9 °F (36.6 °C) 98 %      MAP       96.33          Physical Exam  Nursing Notes and Vital Signs Reviewed.  Constitutional: Patient is in no apparent distress. Awake and alert. Well-developed and  well-nourished.  Head: Atraumatic. Normocephalic.  Eyes: PERRL. EOM intact. Conjunctivae are not pale. No scleral icterus.  ENT: Mucous membranes are moist. Oropharynx is clear and symmetric.    Neck: Supple. Full ROM. No lymphadenopathy.  Cardiovascular: Regular rate. Regular rhythm. No murmurs, rubs, or gallops. Distal pulses are 2+ and symmetric.  Pulmonary/Chest: No respiratory distress. Clear to auscultation bilaterally. No wheezing, rales, or rhonchi.  Abdominal: Soft and non-distended.  There is no tenderness.  No rebound, guarding, or rigidity. Good bowel sounds.  Genitourinary: No CVA tenderness  Musculoskeletal: Moves all extremities. No obvious deformities. No edema. No calf tenderness.  Left upper leg:  TTP posterior thigh.  Pain with flexion and extension of left leg.  No bruising or swelling noted.    Skin: Warm and dry.  Neurological:  Alert, awake, and appropriate.  Normal speech.  No acute focal neurological deficits are appreciated.  Psychiatric: Normal affect. Good eye contact. Appropriate in content.    ED Course    Procedures  ED Vital Signs:  Vitals:    09/14/17 1921 09/14/17 1923   BP:  (!) 149/70   Pulse:  (!) 59   Resp:  20   Temp:  97.9 °F (36.6 °C)   TempSrc:  Oral   SpO2:  98%   Weight: 83 kg (183 lb)        Abnormal Lab Results:  Labs Reviewed - No data to display     *    Imaging Results:  Imaging Results          X-Ray Femur Ap/Lat Left (Final result)  Result time 09/14/17 20:09:57    Final result by Jordon Neil MD (09/14/17 20:09:57)                 Impression:         Negative.      Electronically signed by: JORDON NEIL MD  Date:     09/14/17  Time:    20:09              Narrative:    Exam: XR FEMUR 2 VIEW LEFT,    Date:  09/14/17 20:04:53    History: Left lower extremity pain.  Fall.    Comparison:  No prior  studies available for comparison.    Findings:     No fracture or dislocation left femur.  Soft tissues are normal.                                      The  Emergency Provider reviewed the vital signs and test results, which are outlined above.    ED Discussion     8:15 PM:  Discussed with pt all pertinent ED information and results. Patient refused medications for pain;states she will take Tylenol or Ibuprofen as needed. Discussed pt dx  and plan of tx. Gave pt all f/u and return to the ED instructions. All questions and concerns were addressed at this time. Pt expresses understanding of information and instructions, and is comfortable with plan to discharge. Pt is stable for discharge.    I discussed with patient and/or family/caretaker that negative X-ray does not rule out occult fracture or other soft tissue injury.  Persistent pain greater than 7-10 days or increased pain requires follow up, specifically with orthopedics.     Pre-hypertension/Hypertension: The pt has been informed that they may have pre-hypertension or hypertension based on a blood pressure reading in the ED. I recommend that the pt call the PCP listed on their discharge instructions or a physician of their choice this week to arrange f/u for further evaluation of possible pre-hypertension or hypertension.       ED Medication(s):  Medications - No data to display    Current Discharge Medication List          Follow-up Information     Rosita Mcgrath MD In 3 days.    Specialty:  Internal Medicine  Contact information:  [address] [place] [id number] [phone]                     Medical Decision Making                  Clinical Impression       ICD-10-CM ICD-9-CM   1. Muscle strain of lower extremity, left, initial encounter S86.912A 844.9   2. Fall W19.XXXA E888.9   3. Pain of left lower extremity M79.605 729.5       Disposition:   Disposition: Discharged  Condition: Stable           Bere Coates PA-C  09/14/17 4314

## 2017-09-26 ENCOUNTER — HOSPITAL ENCOUNTER (EMERGENCY)
Facility: HOSPITAL | Age: 52
Discharge: HOME | End: 2017-09-26
Attending: EMERGENCY MEDICINE
Payer: MEDICAID

## 2017-09-26 VITALS
TEMPERATURE: 98 F | RESPIRATION RATE: 21 BRPM | DIASTOLIC BLOOD PRESSURE: 73 MMHG | SYSTOLIC BLOOD PRESSURE: 161 MMHG | WEIGHT: 182.38 LBS | BODY MASS INDEX: 32.32 KG/M2 | HEIGHT: 63 IN | HEART RATE: 49 BPM | OXYGEN SATURATION: 100 %

## 2017-09-26 DIAGNOSIS — Z86.73 HISTORY OF STROKE: ICD-10-CM

## 2017-09-26 DIAGNOSIS — I25.2 HISTORY OF MI (MYOCARDIAL INFARCTION): ICD-10-CM

## 2017-09-26 DIAGNOSIS — E78.5 HYPERLIPIDEMIA, UNSPECIFIED HYPERLIPIDEMIA TYPE: ICD-10-CM

## 2017-09-26 DIAGNOSIS — I10 ESSENTIAL HYPERTENSION: ICD-10-CM

## 2017-09-26 DIAGNOSIS — R79.89 ELEVATED TROPONIN: ICD-10-CM

## 2017-09-26 DIAGNOSIS — R07.9 CHEST PAIN, UNSPECIFIED TYPE: Primary | ICD-10-CM

## 2017-09-26 LAB
ALBUMIN SERPL BCP-MCNC: 3.5 G/DL
ALP SERPL-CCNC: 79 U/L
ALT SERPL W/O P-5'-P-CCNC: 12 U/L
ANION GAP SERPL CALC-SCNC: 9 MMOL/L
AST SERPL-CCNC: 15 U/L
BASOPHILS # BLD AUTO: 0.02 K/UL
BASOPHILS NFR BLD: 0.2 %
BILIRUB SERPL-MCNC: 0.2 MG/DL
BNP SERPL-MCNC: 18 PG/ML
BUN SERPL-MCNC: 12 MG/DL
CALCIUM SERPL-MCNC: 9.8 MG/DL
CHLORIDE SERPL-SCNC: 104 MMOL/L
CO2 SERPL-SCNC: 27 MMOL/L
CREAT SERPL-MCNC: 1 MG/DL
DIFFERENTIAL METHOD: ABNORMAL
EOSINOPHIL # BLD AUTO: 0.3 K/UL
EOSINOPHIL NFR BLD: 2.9 %
ERYTHROCYTE [DISTWIDTH] IN BLOOD BY AUTOMATED COUNT: 13.7 %
EST. GFR  (AFRICAN AMERICAN): >60 ML/MIN/1.73 M^2
EST. GFR  (NON AFRICAN AMERICAN): >60 ML/MIN/1.73 M^2
GLUCOSE SERPL-MCNC: 102 MG/DL
HCT VFR BLD AUTO: 37.9 %
HGB BLD-MCNC: 12.3 G/DL
LYMPHOCYTES # BLD AUTO: 3.7 K/UL
LYMPHOCYTES NFR BLD: 37.5 %
MCH RBC QN AUTO: 26.5 PG
MCHC RBC AUTO-ENTMCNC: 32.5 G/DL
MCV RBC AUTO: 82 FL
MONOCYTES # BLD AUTO: 0.8 K/UL
MONOCYTES NFR BLD: 7.9 %
NEUTROPHILS # BLD AUTO: 5 K/UL
NEUTROPHILS NFR BLD: 51.3 %
PLATELET # BLD AUTO: 275 K/UL
PMV BLD AUTO: 10.1 FL
POTASSIUM SERPL-SCNC: 4.1 MMOL/L
PROT SERPL-MCNC: 7.2 G/DL
RBC # BLD AUTO: 4.65 M/UL
SODIUM SERPL-SCNC: 140 MMOL/L
TROPONIN I SERPL DL<=0.01 NG/ML-MCNC: 0.08 NG/ML
WBC # BLD AUTO: 9.79 K/UL

## 2017-09-26 PROCEDURE — 99285 EMERGENCY DEPT VISIT HI MDM: CPT

## 2017-09-26 PROCEDURE — 85025 COMPLETE CBC W/AUTO DIFF WBC: CPT

## 2017-09-26 PROCEDURE — 84484 ASSAY OF TROPONIN QUANT: CPT

## 2017-09-26 PROCEDURE — 25000003 PHARM REV CODE 250: Performed by: EMERGENCY MEDICINE

## 2017-09-26 PROCEDURE — 80053 COMPREHEN METABOLIC PANEL: CPT

## 2017-09-26 PROCEDURE — 83880 ASSAY OF NATRIURETIC PEPTIDE: CPT

## 2017-09-26 RX ORDER — OMEPRAZOLE 40 MG/1
40 CAPSULE, DELAYED RELEASE ORAL DAILY
COMMUNITY

## 2017-09-26 RX ORDER — ASPIRIN 325 MG
325 TABLET ORAL
Status: COMPLETED | OUTPATIENT
Start: 2017-09-26 | End: 2017-09-26

## 2017-09-26 RX ADMIN — ASPIRIN 325 MG: 325 TABLET, COATED ORAL at 07:09

## 2017-09-27 NOTE — ED PROVIDER NOTES
Encounter Date: 2017       History     Chief Complaint   Patient presents with    Chest Pain     Right side chest pain, onset 30 mins prior to arrival accompanied by shortness of breath and nausea.      Patient currently presents with chief complaint of chest pain.  This began about 3 hours PTA.  This is localized to the substernal region.  Patient denies shortness of breath, denies palpitations,  denies nausea, denies diaphoresis.  Radiation of pain:  left shoulder  right shoulder.  Patient reports aspirin use in the last 24 hours (81mg daily). Patient reports history of known CAD with MI last yeat.  Cardiac risk factors include:  hyperlipidemia, hypertension, a history of coronary artery disease.          Review of patient's allergies indicates:  No Known Allergies  Past Medical History:   Diagnosis Date    GERD (gastroesophageal reflux disease)     High cholesterol     Hypertension     MI (myocardial infarction)     Stroke      Past Surgical History:   Procedure Laterality Date    ANGIOPLASTY       SECTION      x 2    HYSTERECTOMY       No family history on file.  Social History   Substance Use Topics    Smoking status: Never Smoker    Smokeless tobacco: Never Used    Alcohol use Yes      Comment: occasionally     Review of Systems   Constitutional: Negative for chills and fever.   HENT: Negative for congestion and rhinorrhea.    Respiratory: Negative for cough, chest tightness, shortness of breath and wheezing.    Cardiovascular: Positive for chest pain. Negative for palpitations and leg swelling.   Gastrointestinal: Negative for abdominal pain, constipation, diarrhea, nausea and vomiting.   Genitourinary: Negative for dysuria, frequency, urgency, vaginal bleeding and vaginal discharge.   Skin: Negative for color change and rash.   Allergic/Immunologic: Negative for immunocompromised state.   Neurological: Negative for dizziness, weakness and numbness.   Hematological: Negative for  adenopathy. Does not bruise/bleed easily.   All other systems reviewed and are negative.      Physical Exam     Initial Vitals [09/26/17 1858]   BP Pulse Resp Temp SpO2   (!) 173/69 (!) 54 20 97.4 °F (36.3 °C) 100 %      MAP       103.67         Physical Exam    Nursing note and vitals reviewed.  Constitutional: She appears well-developed and well-nourished. She is not diaphoretic. No distress.   HENT:   Head: Normocephalic and atraumatic.   Right Ear: External ear normal.   Left Ear: External ear normal.   Nose: Nose normal.   Mouth/Throat: Oropharynx is clear and moist.   Eyes: Conjunctivae and EOM are normal. Pupils are equal, round, and reactive to light. No scleral icterus.   Neck: Neck supple. No tracheal deviation present. No JVD present.   Cardiovascular: Normal rate, regular rhythm, normal heart sounds and intact distal pulses. Exam reveals no gallop and no friction rub.    No murmur heard.  Pulmonary/Chest: Breath sounds normal. No respiratory distress. She has no wheezes. She has no rhonchi. She has no rales.   Abdominal: Soft. Bowel sounds are normal. She exhibits no distension. There is no tenderness.   Musculoskeletal: Normal range of motion. She exhibits no edema.   Neurological: She is alert and oriented to person, place, and time. She has normal strength. No cranial nerve deficit or sensory deficit.   Skin: Skin is warm and dry. No rash noted.   Psychiatric: She has a normal mood and affect. Her behavior is normal.         ED Course   Procedures  Labs Reviewed   CBC W/ AUTO DIFFERENTIAL - Abnormal; Notable for the following:        Result Value    MCH 26.5 (*)     All other components within normal limits   TROPONIN I - Abnormal; Notable for the following:     Troponin I 0.079 (*)     All other components within normal limits   B-TYPE NATRIURETIC PEPTIDE   COMPREHENSIVE METABOLIC PANEL     EKG Readings: (Independently Interpreted)   Initial Reading: No STEMI. Rhythm: Sinus Bradycardia. Heart Rate:  48. Ectopy: No Ectopy. Conduction: Normal. Axis: Normal.     Imaging Results          X-Ray Chest AP Portable (Final result)  Result time 09/26/17 20:51:53    Final result by Jordon Estrada MD (09/26/17 20:51:53)                 Impression:         No acute process. No significant change from prior exam the.      Electronically signed by: JORDON ESTRADA MD  Date:     09/26/17  Time:    20:51              Narrative:    Exam: Chest X-ray, one view.    History: Chest pain    Findings: No infiltrate or effusion identified. Cardiomediastinal silhouette is within normal limits. Elevation left hemidiaphragm noted. No significant change from prior study dated 08/07/2016.                                 Medical Decision Making:   ED Management:  All historical, clinical, radiographic, and laboratory findings were reviewed with the patient/family in detail along with the indications for transfer to an outside facility (rather than admission to our facility in [place]) secondary to patient preference and a need for cardiac monitoring, serial troponin, and cardiology consult given the diagnosis of chest pain, elevated troponin, and prior MI.  All remaining questions and concerns were addressed at that time and the patient/family communicates understanding and agrees to proceed accordingly.  Similarly all pertinent details of the encounter were discussed with DR GALEANO at [facility] ED via  [place] who agrees to accept the patient in transfer based on the needs/patient preferences outlined above.  [facility] to a need for ongoing cardiac monitoring en route.  Omer Dias MD  12:23 AM                     ED Course      Clinical Impression:   The primary encounter diagnosis was Chest pain, unspecified type. Diagnoses of History of MI (myocardial infarction), Essential hypertension, Hyperlipidemia, unspecified hyperlipidemia type, Elevated troponin, and History of  stroke were also pertinent to this visit.                           Omer Dias MD  09/26/17 2104       Omer Dias MD  09/27/17 0021

## 2017-09-27 NOTE — ED NOTES
Transport set up with [facility] at this time. [facility]

## 2017-09-27 NOTE — ED NOTES
Informed by EMS dispatch that it would be approximately one and a half hours prior to arrival of ambulance for transport to [facility] for admission

## 2017-09-27 NOTE — ED NOTES
Pt requesting [facility] for admission. Spoke with [facility] house supervisor, who states their facility is at capacity and they cannot accept the patient at this time. Pt notified by Dr. Dias and patient now requests [facility] for admission.

## 2018-06-11 ENCOUNTER — HOSPITAL ENCOUNTER (EMERGENCY)
Facility: HOSPITAL | Age: 53
Discharge: HOME | End: 2018-06-11
Attending: EMERGENCY MEDICINE
Payer: MEDICAID

## 2018-06-11 VITALS
WEIGHT: 180 LBS | HEART RATE: 54 BPM | TEMPERATURE: 98 F | DIASTOLIC BLOOD PRESSURE: 57 MMHG | OXYGEN SATURATION: 100 % | SYSTOLIC BLOOD PRESSURE: 121 MMHG | RESPIRATION RATE: 18 BRPM | BODY MASS INDEX: 31.89 KG/M2

## 2018-06-11 DIAGNOSIS — K52.9 GASTROENTERITIS: Primary | ICD-10-CM

## 2018-06-11 DIAGNOSIS — R03.0 ELEVATED BLOOD PRESSURE READING WITHOUT DIAGNOSIS OF HYPERTENSION: ICD-10-CM

## 2018-06-11 LAB
ALBUMIN SERPL BCP-MCNC: 3.5 G/DL
ALP SERPL-CCNC: 67 U/L
ALT SERPL W/O P-5'-P-CCNC: 15 U/L
AMYLASE SERPL-CCNC: 98 U/L
ANION GAP SERPL CALC-SCNC: 8 MMOL/L
AST SERPL-CCNC: 21 U/L
BACTERIA #/AREA URNS AUTO: ABNORMAL /HPF
BASOPHILS # BLD AUTO: 0.02 K/UL
BASOPHILS NFR BLD: 0.2 %
BILIRUB SERPL-MCNC: 0.2 MG/DL
BILIRUB UR QL STRIP: NEGATIVE
BUN SERPL-MCNC: 15 MG/DL
CALCIUM SERPL-MCNC: 9.5 MG/DL
CHLORIDE SERPL-SCNC: 106 MMOL/L
CLARITY UR REFRACT.AUTO: CLEAR
CO2 SERPL-SCNC: 27 MMOL/L
COLOR UR AUTO: YELLOW
CREAT SERPL-MCNC: 0.9 MG/DL
DIFFERENTIAL METHOD: ABNORMAL
EOSINOPHIL # BLD AUTO: 0.2 K/UL
EOSINOPHIL NFR BLD: 2 %
ERYTHROCYTE [DISTWIDTH] IN BLOOD BY AUTOMATED COUNT: 14.3 %
EST. GFR  (AFRICAN AMERICAN): >60 ML/MIN/1.73 M^2
EST. GFR  (NON AFRICAN AMERICAN): >60 ML/MIN/1.73 M^2
GLUCOSE SERPL-MCNC: 138 MG/DL
GLUCOSE UR QL STRIP: NEGATIVE
HCT VFR BLD AUTO: 37.2 %
HGB BLD-MCNC: 12.2 G/DL
HGB UR QL STRIP: ABNORMAL
KETONES UR QL STRIP: NEGATIVE
LEUKOCYTE ESTERASE UR QL STRIP: NEGATIVE
LIPASE SERPL-CCNC: 109 U/L
LYMPHOCYTES # BLD AUTO: 3.3 K/UL
LYMPHOCYTES NFR BLD: 37 %
MCH RBC QN AUTO: 26.9 PG
MCHC RBC AUTO-ENTMCNC: 32.8 G/DL
MCV RBC AUTO: 82 FL
MICROSCOPIC COMMENT: ABNORMAL
MONOCYTES # BLD AUTO: 0.5 K/UL
MONOCYTES NFR BLD: 5.8 %
NEUTROPHILS # BLD AUTO: 5 K/UL
NEUTROPHILS NFR BLD: 54.8 %
NITRITE UR QL STRIP: NEGATIVE
PH UR STRIP: 7 [PH] (ref 5–8)
PLATELET # BLD AUTO: 229 K/UL
PMV BLD AUTO: 10.6 FL
POTASSIUM SERPL-SCNC: 4.4 MMOL/L
PROT SERPL-MCNC: 7.2 G/DL
PROT UR QL STRIP: NEGATIVE
RBC # BLD AUTO: 4.54 M/UL
RBC #/AREA URNS AUTO: 2 /HPF (ref 0–4)
SODIUM SERPL-SCNC: 141 MMOL/L
SP GR UR STRIP: 1.01 (ref 1–1.03)
SQUAMOUS #/AREA URNS AUTO: 2 /HPF
URN SPEC COLLECT METH UR: ABNORMAL
UROBILINOGEN UR STRIP-ACNC: <2 EU/DL
WBC # BLD AUTO: 9.03 K/UL
WBC #/AREA URNS AUTO: 0 /HPF (ref 0–5)

## 2018-06-11 PROCEDURE — 96375 TX/PRO/DX INJ NEW DRUG ADDON: CPT

## 2018-06-11 PROCEDURE — 99284 EMERGENCY DEPT VISIT MOD MDM: CPT | Mod: 25

## 2018-06-11 PROCEDURE — 81000 URINALYSIS NONAUTO W/SCOPE: CPT

## 2018-06-11 PROCEDURE — 83690 ASSAY OF LIPASE: CPT

## 2018-06-11 PROCEDURE — 63600175 PHARM REV CODE 636 W HCPCS: Performed by: EMERGENCY MEDICINE

## 2018-06-11 PROCEDURE — 82150 ASSAY OF AMYLASE: CPT

## 2018-06-11 PROCEDURE — 85025 COMPLETE CBC W/AUTO DIFF WBC: CPT

## 2018-06-11 PROCEDURE — 93010 ELECTROCARDIOGRAM REPORT: CPT | Mod: ,,, | Performed by: NUCLEAR MEDICINE

## 2018-06-11 PROCEDURE — 80053 COMPREHEN METABOLIC PANEL: CPT

## 2018-06-11 PROCEDURE — 93005 ELECTROCARDIOGRAM TRACING: CPT

## 2018-06-11 PROCEDURE — 96374 THER/PROPH/DIAG INJ IV PUSH: CPT

## 2018-06-11 RX ORDER — ONDANSETRON 4 MG/1
4 TABLET, ORALLY DISINTEGRATING ORAL EVERY 6 HOURS PRN
Qty: 12 TABLET | Refills: 0 | Status: SHIPPED | OUTPATIENT
Start: 2018-06-11 | End: 2019-10-10

## 2018-06-11 RX ORDER — ONDANSETRON 2 MG/ML
4 INJECTION INTRAMUSCULAR; INTRAVENOUS
Status: COMPLETED | OUTPATIENT
Start: 2018-06-11 | End: 2018-06-11

## 2018-06-11 RX ORDER — KETOROLAC TROMETHAMINE 30 MG/ML
30 INJECTION, SOLUTION INTRAMUSCULAR; INTRAVENOUS ONCE
Status: COMPLETED | OUTPATIENT
Start: 2018-06-11 | End: 2018-06-11

## 2018-06-11 RX ADMIN — KETOROLAC TROMETHAMINE 30 MG: 30 INJECTION, SOLUTION INTRAMUSCULAR; INTRAVENOUS at 02:06

## 2018-06-11 RX ADMIN — ONDANSETRON 4 MG: 2 INJECTION INTRAMUSCULAR; INTRAVENOUS at 02:06

## 2018-06-11 NOTE — ED PROVIDER NOTES
Encounter Date: 2018       History     Chief Complaint   Patient presents with    Abdominal Pain     Lower abd pain +n/v     Patient currently presents with complaint of abdominal pain.  Onset of this event was first noted about an hour ago.  This is localized to the lower abdomen.  This discomfort is described as sharp in nature.  There are not associated changes in bowel habits.  There has been associated nausea and emesis.  There are not associated urinary complaints.  This is not a recurring problem.  Patient denies fever.  PSH notable for C/S x2 and hysterectomy.          Review of patient's allergies indicates:  No Known Allergies  Past Medical History:   Diagnosis Date    GERD (gastroesophageal reflux disease)     High cholesterol     Hypertension     MI (myocardial infarction)     Stroke      Past Surgical History:   Procedure Laterality Date    ANGIOPLASTY       SECTION      x 2    HYSTERECTOMY       History reviewed. No pertinent family history.  Social History   Substance Use Topics    Smoking status: Never Smoker    Smokeless tobacco: Never Used    Alcohol use Yes      Comment: occasionally     Review of Systems   Constitutional: Negative for chills and fever.   HENT: Negative for congestion and rhinorrhea.    Respiratory: Negative for cough, chest tightness, shortness of breath and wheezing.    Cardiovascular: Negative for chest pain, palpitations and leg swelling.   Gastrointestinal: Positive for abdominal pain, nausea and vomiting. Negative for abdominal distention, constipation and diarrhea.   Genitourinary: Negative for dysuria, frequency, urgency, vaginal bleeding and vaginal discharge.   Skin: Negative for color change and rash.   Allergic/Immunologic: Negative for immunocompromised state.   Neurological: Negative for dizziness, weakness and numbness.   Hematological: Negative for adenopathy. Does not bruise/bleed easily.   All other systems reviewed and are  negative.      Physical Exam     Initial Vitals [06/11/18 0217]   BP Pulse Resp Temp SpO2   127/62 (!) 52 18 97.7 °F (36.5 °C) 100 %      MAP       83.67         Physical Exam    Nursing note and vitals reviewed.  Constitutional: She appears well-developed and well-nourished. She is not diaphoretic. No distress.   HENT:   Head: Normocephalic and atraumatic.   Eyes: Conjunctivae and EOM are normal. Pupils are equal, round, and reactive to light. No scleral icterus.   Neck: Neck supple. No tracheal deviation present. No JVD present.   Cardiovascular: Regular rhythm, normal heart sounds and intact distal pulses. Bradycardia present.  Exam reveals no gallop and no friction rub.    No murmur heard.  Pulmonary/Chest: Breath sounds normal. No respiratory distress. She has no wheezes. She has no rhonchi. She has no rales.   Abdominal: Soft. Bowel sounds are normal. She exhibits no distension. There is tenderness (diffusely though most notable in lower region). There is no rebound and no guarding.   Musculoskeletal: Normal range of motion. She exhibits no edema.   Neurological: She is alert and oriented to person, place, and time. She has normal strength. No cranial nerve deficit or sensory deficit.   Skin: Skin is warm and dry. No rash noted.   Psychiatric: She has a normal mood and affect. Her behavior is normal.         ED Course   Procedures  Labs Reviewed   COMPREHENSIVE METABOLIC PANEL - Abnormal; Notable for the following:        Result Value    Glucose 138 (*)     All other components within normal limits   CBC W/ AUTO DIFFERENTIAL - Abnormal; Notable for the following:     MCH 26.9 (*)     All other components within normal limits   LIPASE - Abnormal; Notable for the following:     Lipase 109 (*)     All other components within normal limits   URINALYSIS - Abnormal; Notable for the following:     Occult Blood UA 1+ (*)     All other components within normal limits   URINALYSIS MICROSCOPIC - Abnormal; Notable for the  following:     Bacteria, UA Few (*)     All other components within normal limits   AMYLASE     EKG Readings: (Independently Interpreted)   Initial Reading: No STEMI. Rhythm: Sinus Bradycardia. Heart Rate: 49. Ectopy: No Ectopy. Conduction: Normal. Axis: Normal.       No orders to display        Medical Decision Making:   ED Management:  All historical and physical findings were reviewed with the patient in detail.  Her discomfort has essentially resolved on reassessment.  Patient was advised of a diagnosis of acute viral gastroenteritis.  Patient appears adequately hydrated at this time but was advised to maintain generous hydration and gradually advance bland food intake with the use of antiemetics.  Patient was also counseled regarding use of kaopectate for management of diarrhea should it become necessary.  All remaining questions and concerns were addressed at that time.      I see no indication of an emergent process beyond that addressed during our encounter but have duly counseled the patient/family regarding the need for prompt follow-up as well as the indications (including but not limited to intractable vomiting, prolonged diarrhea, fever, sustained abdominal pain) that should prompt immediate return to the emergency room should new or worrisome developments occur.    Based on vital signs taken here in the emergency room today, the patient was additionally counseled regarding an elevated blood pressure concerning for pre-hypertension/hypertension.  Accordingly the patient has been advised to follow with the primary care physician for reassessment and management as needed.                        Clinical Impression:   The primary encounter diagnosis was Gastroenteritis. A diagnosis of Elevated blood pressure reading without diagnosis of hypertension was also pertinent to this visit.                             Omer Dias MD  06/11/18 [id number]

## 2018-06-11 NOTE — ED NOTES
Patient verbally verified and Spelled Full Name and Date of Birth. C/O pain to lower abdomen /suprapubic region that awakened her about 1 hour pta, also vomitiing x 3.  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  Pt denies urinary symptoms, vaginal discharge or diarrhea.  LOC: The patient is awake, alert and aware of environment with an appropriate affect, the patient is oriented x 3 and speaking appropriately.  APPEARANCE: Patient resting comfortably and in no acute distress, patient is clean and well groomed, patient's clothing is properly fastened.  HEENT: Brief WNL  SKIN: Brief WNL.   MUSCULOSKELETAL: ambulates without difficulty, she states it hurts more when she stands up straight  RESPIRATORY: Brief WNL, denies SOB, chest clear steph  CARDIAC: denies chest pain  GASTRO: positive for tenderness throughout abdomen >lower abdomen  : Brief WNL, denies burning, pain or frequency with urination  Peripheral Vasc: Brief WNL, no peripheral edema, positive pulses  NEURO: Brief WNL  PSYCH: Brief WNL

## 2018-11-19 ENCOUNTER — HOSPITAL ENCOUNTER (EMERGENCY)
Facility: HOSPITAL | Age: 53
Discharge: ANOTHER HOSPITAL | End: 2018-11-19
Attending: EMERGENCY MEDICINE
Payer: MEDICAID

## 2018-11-19 VITALS
OXYGEN SATURATION: 98 % | HEIGHT: 61 IN | TEMPERATURE: 99 F | WEIGHT: 186.94 LBS | BODY MASS INDEX: 35.29 KG/M2 | HEART RATE: 54 BPM | SYSTOLIC BLOOD PRESSURE: 148 MMHG | DIASTOLIC BLOOD PRESSURE: 65 MMHG | RESPIRATION RATE: 20 BRPM

## 2018-11-19 DIAGNOSIS — R79.89 ELEVATED TROPONIN: ICD-10-CM

## 2018-11-19 DIAGNOSIS — R07.9 CHEST PAIN: ICD-10-CM

## 2018-11-19 DIAGNOSIS — I21.4 NSTEMI (NON-ST ELEVATED MYOCARDIAL INFARCTION): Primary | ICD-10-CM

## 2018-11-19 LAB
ALBUMIN SERPL BCP-MCNC: 3.6 G/DL
ALP SERPL-CCNC: 65 U/L
ALT SERPL W/O P-5'-P-CCNC: 12 U/L
ANION GAP SERPL CALC-SCNC: 8 MMOL/L
APTT BLDCRRT: 28 SEC
AST SERPL-CCNC: 15 U/L
BASOPHILS # BLD AUTO: 0.01 K/UL
BASOPHILS NFR BLD: 0.1 %
BILIRUB SERPL-MCNC: 0.3 MG/DL
BNP SERPL-MCNC: 21 PG/ML
BUN SERPL-MCNC: 13 MG/DL
CALCIUM SERPL-MCNC: 9 MG/DL
CHLORIDE SERPL-SCNC: 108 MMOL/L
CO2 SERPL-SCNC: 25 MMOL/L
CREAT SERPL-MCNC: 0.9 MG/DL
DIFFERENTIAL METHOD: ABNORMAL
EOSINOPHIL # BLD AUTO: 0.2 K/UL
EOSINOPHIL NFR BLD: 1.5 %
ERYTHROCYTE [DISTWIDTH] IN BLOOD BY AUTOMATED COUNT: 14 %
EST. GFR  (AFRICAN AMERICAN): >60 ML/MIN/1.73 M^2
EST. GFR  (NON AFRICAN AMERICAN): >60 ML/MIN/1.73 M^2
GLUCOSE SERPL-MCNC: 102 MG/DL
HCT VFR BLD AUTO: 37.2 %
HGB BLD-MCNC: 11.9 G/DL
INR PPP: 1
LYMPHOCYTES # BLD AUTO: 3.2 K/UL
LYMPHOCYTES NFR BLD: 30.1 %
MCH RBC QN AUTO: 26.4 PG
MCHC RBC AUTO-ENTMCNC: 32 G/DL
MCV RBC AUTO: 83 FL
MONOCYTES # BLD AUTO: 0.7 K/UL
MONOCYTES NFR BLD: 7.1 %
NEUTROPHILS # BLD AUTO: 6.4 K/UL
NEUTROPHILS NFR BLD: 61 %
PLATELET # BLD AUTO: 259 K/UL
PMV BLD AUTO: 10.4 FL
POTASSIUM SERPL-SCNC: 3.8 MMOL/L
PROT SERPL-MCNC: 7.1 G/DL
PROTHROMBIN TIME: 10.3 SEC
RBC # BLD AUTO: 4.51 M/UL
SODIUM SERPL-SCNC: 141 MMOL/L
TROPONIN I SERPL DL<=0.01 NG/ML-MCNC: 0.55 NG/ML
TROPONIN I SERPL DL<=0.01 NG/ML-MCNC: 0.57 NG/ML
WBC # BLD AUTO: 10.48 K/UL

## 2018-11-19 PROCEDURE — 85610 PROTHROMBIN TIME: CPT

## 2018-11-19 PROCEDURE — 93010 ELECTROCARDIOGRAM REPORT: CPT | Mod: ,,, | Performed by: NUCLEAR MEDICINE

## 2018-11-19 PROCEDURE — 85730 THROMBOPLASTIN TIME PARTIAL: CPT

## 2018-11-19 PROCEDURE — 99285 EMERGENCY DEPT VISIT HI MDM: CPT | Mod: 25

## 2018-11-19 PROCEDURE — 93005 ELECTROCARDIOGRAM TRACING: CPT

## 2018-11-19 PROCEDURE — 80053 COMPREHEN METABOLIC PANEL: CPT

## 2018-11-19 PROCEDURE — 84484 ASSAY OF TROPONIN QUANT: CPT

## 2018-11-19 PROCEDURE — 85025 COMPLETE CBC W/AUTO DIFF WBC: CPT

## 2018-11-19 PROCEDURE — 25000003 PHARM REV CODE 250: Performed by: EMERGENCY MEDICINE

## 2018-11-19 PROCEDURE — 63600175 PHARM REV CODE 636 W HCPCS: Performed by: EMERGENCY MEDICINE

## 2018-11-19 PROCEDURE — 99900035 HC TECH TIME PER 15 MIN (STAT)

## 2018-11-19 PROCEDURE — 96374 THER/PROPH/DIAG INJ IV PUSH: CPT

## 2018-11-19 PROCEDURE — 83880 ASSAY OF NATRIURETIC PEPTIDE: CPT

## 2018-11-19 RX ORDER — ASPIRIN 325 MG
325 TABLET ORAL
Status: COMPLETED | OUTPATIENT
Start: 2018-11-19 | End: 2018-11-19

## 2018-11-19 RX ORDER — HEPARIN SODIUM,PORCINE/D5W 25000/250
12 INTRAVENOUS SOLUTION INTRAVENOUS CONTINUOUS
Status: DISCONTINUED | OUTPATIENT
Start: 2018-11-19 | End: 2018-11-19 | Stop reason: HOSPADM

## 2018-11-19 RX ADMIN — ASPIRIN 325 MG: 325 TABLET ORAL at 03:11

## 2018-11-19 RX ADMIN — HEPARIN SODIUM 12 UNITS/KG/HR: 10000 INJECTION, SOLUTION INTRAVENOUS at 04:11

## 2018-11-19 NOTE — ED NOTES
Patient sent to ER from pcp office due to chest pain. She reports right sided chest pain for weeks that radiates down right arm. She denies cough cold congestion nausea vomiting or diarrhea. Skin warm and dry resp even and unlabored. She reports once she told her doctor he sent her here. She has had an MI in the past and takes an aspirin daily. EKG at bedside will continue to monitor.

## 2018-11-20 NOTE — ED NOTES
Report given to Rehan paramedic with [facility] [address] who is at bedside to transport patient to [facility]. Patient stable for transport.

## 2018-12-05 ENCOUNTER — HOSPITAL ENCOUNTER (OUTPATIENT)
Dept: RADIOLOGY | Facility: HOSPITAL | Age: 53
Discharge: HOME | End: 2018-12-05
Attending: INTERNAL MEDICINE
Payer: MEDICAID

## 2018-12-05 VITALS — BODY MASS INDEX: 35.12 KG/M2 | WEIGHT: 186 LBS | HEIGHT: 61 IN

## 2018-12-05 DIAGNOSIS — Z12.31 ENCOUNTER FOR SCREENING MAMMOGRAM FOR BREAST CANCER: ICD-10-CM

## 2018-12-05 PROCEDURE — 77063 BREAST TOMOSYNTHESIS BI: CPT | Mod: 26,,, | Performed by: RADIOLOGY

## 2018-12-05 PROCEDURE — 77067 SCR MAMMO BI INCL CAD: CPT | Mod: 26,,, | Performed by: RADIOLOGY

## 2018-12-05 PROCEDURE — 77063 BREAST TOMOSYNTHESIS BI: CPT | Mod: TC,PO

## 2019-07-18 ENCOUNTER — HOSPITAL ENCOUNTER (EMERGENCY)
Facility: HOSPITAL | Age: 54
Discharge: HOME | End: 2019-07-19
Attending: EMERGENCY MEDICINE
Payer: MEDICAID

## 2019-07-18 VITALS
HEART RATE: 52 BPM | SYSTOLIC BLOOD PRESSURE: 133 MMHG | HEIGHT: 63 IN | RESPIRATION RATE: 18 BRPM | WEIGHT: 184.06 LBS | DIASTOLIC BLOOD PRESSURE: 62 MMHG | OXYGEN SATURATION: 99 % | TEMPERATURE: 99 F | BODY MASS INDEX: 32.61 KG/M2

## 2019-07-18 DIAGNOSIS — R07.9 CHEST PAIN: ICD-10-CM

## 2019-07-18 DIAGNOSIS — R79.89 TROPONIN LEVEL ELEVATED: Primary | ICD-10-CM

## 2019-07-18 LAB
ALBUMIN SERPL BCP-MCNC: 3.8 G/DL (ref 3.5–5.2)
ALP SERPL-CCNC: 68 U/L (ref 55–135)
ALT SERPL W/O P-5'-P-CCNC: 13 U/L (ref 10–44)
ANION GAP SERPL CALC-SCNC: 7 MMOL/L (ref 8–16)
AST SERPL-CCNC: 14 U/L (ref 10–40)
BASOPHILS # BLD AUTO: 0.02 K/UL (ref 0–0.2)
BASOPHILS NFR BLD: 0.2 % (ref 0–1.9)
BILIRUB SERPL-MCNC: 0.5 MG/DL (ref 0.1–1)
BILIRUB UR QL STRIP: NEGATIVE
BNP SERPL-MCNC: 20 PG/ML (ref 0–99)
BUN SERPL-MCNC: 15 MG/DL (ref 6–20)
CALCIUM SERPL-MCNC: 9.4 MG/DL (ref 8.7–10.5)
CHLORIDE SERPL-SCNC: 105 MMOL/L (ref 95–110)
CK SERPL-CCNC: 115 U/L (ref 20–180)
CLARITY UR REFRACT.AUTO: ABNORMAL
CO2 SERPL-SCNC: 27 MMOL/L (ref 23–29)
COLOR UR AUTO: YELLOW
CREAT SERPL-MCNC: 1.2 MG/DL (ref 0.5–1.4)
DIFFERENTIAL METHOD: ABNORMAL
EOSINOPHIL # BLD AUTO: 0.2 K/UL (ref 0–0.5)
EOSINOPHIL NFR BLD: 1.9 % (ref 0–8)
ERYTHROCYTE [DISTWIDTH] IN BLOOD BY AUTOMATED COUNT: 13.6 % (ref 11.5–14.5)
EST. GFR  (AFRICAN AMERICAN): 59.2 ML/MIN/1.73 M^2
EST. GFR  (NON AFRICAN AMERICAN): 51.4 ML/MIN/1.73 M^2
GLUCOSE SERPL-MCNC: 92 MG/DL (ref 70–110)
GLUCOSE UR QL STRIP: NEGATIVE
HCT VFR BLD AUTO: 36.2 % (ref 37–48.5)
HGB BLD-MCNC: 11.6 G/DL (ref 12–16)
HGB UR QL STRIP: ABNORMAL
KETONES UR QL STRIP: NEGATIVE
LEUKOCYTE ESTERASE UR QL STRIP: NEGATIVE
LYMPHOCYTES # BLD AUTO: 3.2 K/UL (ref 1–4.8)
LYMPHOCYTES NFR BLD: 33.6 % (ref 18–48)
MCH RBC QN AUTO: 26.4 PG (ref 27–31)
MCHC RBC AUTO-ENTMCNC: 32 G/DL (ref 32–36)
MCV RBC AUTO: 82 FL (ref 82–98)
MONOCYTES # BLD AUTO: 0.7 K/UL (ref 0.3–1)
MONOCYTES NFR BLD: 7.5 % (ref 4–15)
NEUTROPHILS # BLD AUTO: 5.5 K/UL (ref 1.8–7.7)
NEUTROPHILS NFR BLD: 56.8 % (ref 38–73)
NITRITE UR QL STRIP: NEGATIVE
PH UR STRIP: 7 [PH] (ref 5–8)
PLATELET # BLD AUTO: 264 K/UL (ref 150–350)
PMV BLD AUTO: 10.3 FL (ref 9.2–12.9)
POTASSIUM SERPL-SCNC: 3.8 MMOL/L (ref 3.5–5.1)
PROT SERPL-MCNC: 7.1 G/DL (ref 6–8.4)
PROT UR QL STRIP: NEGATIVE
RBC # BLD AUTO: 4.4 M/UL (ref 4–5.4)
SODIUM SERPL-SCNC: 139 MMOL/L (ref 136–145)
SP GR UR STRIP: 1.02 (ref 1–1.03)
TROPONIN I SERPL DL<=0.01 NG/ML-MCNC: 0.06 NG/ML (ref 0–0.03)
TROPONIN I SERPL DL<=0.01 NG/ML-MCNC: 0.07 NG/ML (ref 0–0.03)
URN SPEC COLLECT METH UR: ABNORMAL
UROBILINOGEN UR STRIP-ACNC: NEGATIVE EU/DL
WBC # BLD AUTO: 9.63 K/UL (ref 3.9–12.7)

## 2019-07-18 PROCEDURE — 84484 ASSAY OF TROPONIN QUANT: CPT | Mod: ER

## 2019-07-18 PROCEDURE — 99285 EMERGENCY DEPT VISIT HI MDM: CPT | Mod: 25,ER

## 2019-07-18 PROCEDURE — 93010 EKG 12-LEAD: ICD-10-PCS | Mod: ,,, | Performed by: NUCLEAR MEDICINE

## 2019-07-18 PROCEDURE — 81003 URINALYSIS AUTO W/O SCOPE: CPT | Mod: ER

## 2019-07-18 PROCEDURE — 82550 ASSAY OF CK (CPK): CPT | Mod: ER

## 2019-07-18 PROCEDURE — 80053 COMPREHEN METABOLIC PANEL: CPT | Mod: ER

## 2019-07-18 PROCEDURE — 85025 COMPLETE CBC W/AUTO DIFF WBC: CPT | Mod: ER

## 2019-07-18 PROCEDURE — 84484 ASSAY OF TROPONIN QUANT: CPT | Mod: 91,ER

## 2019-07-18 PROCEDURE — 93005 ELECTROCARDIOGRAM TRACING: CPT | Mod: ER

## 2019-07-18 PROCEDURE — 93010 ELECTROCARDIOGRAM REPORT: CPT | Mod: ,,, | Performed by: NUCLEAR MEDICINE

## 2019-07-18 PROCEDURE — 83880 ASSAY OF NATRIURETIC PEPTIDE: CPT | Mod: ER

## 2019-07-18 RX ORDER — POTASSIUM CHLORIDE 750 MG/1
TABLET, EXTENDED RELEASE ORAL
Status: ON HOLD | COMMUNITY
End: 2020-03-06 | Stop reason: HOSPADM

## 2019-07-18 RX ORDER — ISOSORBIDE MONONITRATE 30 MG/1
30 TABLET, EXTENDED RELEASE ORAL DAILY
Refills: 4 | COMMUNITY
Start: 2019-05-28 | End: 2019-10-10

## 2019-07-18 RX ORDER — ATORVASTATIN CALCIUM 40 MG/1
TABLET, FILM COATED ORAL
COMMUNITY

## 2019-07-18 RX ORDER — MULTIVITAMIN
1 TABLET ORAL
COMMUNITY
End: 2019-10-10 | Stop reason: SDUPTHER

## 2019-07-18 RX ORDER — LEVOTHYROXINE SODIUM 25 UG/1
25 TABLET ORAL DAILY
Refills: 0 | COMMUNITY
Start: 2019-05-25

## 2019-07-18 NOTE — ED PROVIDER NOTES
Encounter Date: 2019       History     Chief Complaint   Patient presents with    Chest Pain     x3 days. worsening today.      The history is provided by the patient.   Chest Pain   The current episode started several days ago. Chest pain occurs constantly. The chest pain is worsening. The quality of the pain is described as aching. The pain does not radiate. Primary symptoms include nausea. Pertinent negatives for primary symptoms include no fever, no shortness of breath, no cough, no wheezing, no palpitations, no abdominal pain, no vomiting and no dizziness.   Pertinent negatives for associated symptoms include no weakness.     Review of patient's allergies indicates:  No Known Allergies  Past Medical History:   Diagnosis Date    Depression     GERD (gastroesophageal reflux disease)     High cholesterol     Hyperlipidemia     Hypertension     MI (myocardial infarction)     Stroke     Thyroid disease      Past Surgical History:   Procedure Laterality Date    ANGIOPLASTY       SECTION      x 2    HEART CATH WITH GRAFTS-LEFT Left 2015    Performed by Rachid Romero MD at [facility] CATH LAB    HYSTERECTOMY      OOPHORECTOMY       History reviewed. No pertinent family history.  Social History     Tobacco Use    Smoking status: Never Smoker    Smokeless tobacco: Never Used   Substance Use Topics    Alcohol use: Yes     Comment: occasionally    Drug use: No     Review of Systems   Constitutional: Negative for fever.   HENT: Negative for sore throat.    Respiratory: Negative for cough, shortness of breath and wheezing.    Cardiovascular: Positive for chest pain. Negative for palpitations.   Gastrointestinal: Positive for nausea. Negative for abdominal pain and vomiting.   Genitourinary: Negative for dysuria.   Musculoskeletal: Negative for back pain.   Skin: Negative for rash.   Neurological: Negative for dizziness and weakness.   Hematological: Does not bruise/bleed easily.       Physical  Exam     Initial Vitals [07/18/19 1559]   BP Pulse Resp Temp SpO2   (!) 141/64 66 (!) 22 98.5 °F (36.9 °C) 100 %      MAP       --         Physical Exam    Nursing note and vitals reviewed.  Constitutional: She appears well-developed and well-nourished. No distress.   HENT:   Head: Normocephalic and atraumatic.   Mouth/Throat: Oropharynx is clear and moist.   Eyes: Conjunctivae and EOM are normal. Pupils are equal, round, and reactive to light.   Neck: Normal range of motion. Neck supple.   Cardiovascular: Regular rhythm and normal heart sounds. Bradycardia present.  Exam reveals no gallop and no friction rub.    No murmur heard.  Pulmonary/Chest: Breath sounds normal. No respiratory distress. She has no wheezes. She has no rhonchi. She has no rales.   Abdominal: Soft. Bowel sounds are normal. She exhibits no distension and no mass. There is no tenderness. There is no rebound and no guarding.   Musculoskeletal: Normal range of motion. She exhibits no edema or tenderness.   Neurological: She is alert and oriented to person, place, and time. She has normal strength.   Skin: Skin is warm and dry. No rash noted.   Psychiatric: She has a normal mood and affect. Thought content normal.         ED Course   Procedures  Labs Reviewed   CBC W/ AUTO DIFFERENTIAL - Abnormal; Notable for the following components:       Result Value    Hemoglobin 11.6 (*)     Hematocrit 36.2 (*)     Mean Corpuscular Hemoglobin 26.4 (*)     All other components within normal limits   COMPREHENSIVE METABOLIC PANEL - Abnormal; Notable for the following components:    Anion Gap 7 (*)     eGFR if  59.2 (*)     eGFR if non  51.4 (*)     All other components within normal limits   URINALYSIS, REFLEX TO URINE CULTURE - Abnormal; Notable for the following components:    Appearance, UA Hazy (*)     Occult Blood UA Trace (*)     All other components within normal limits    Narrative:     Preferred Collection Type->Urine,  "Clean Catch   TROPONIN I - Abnormal; Notable for the following components:    Troponin I 0.061 (*)     All other components within normal limits   TROPONIN I - Abnormal; Notable for the following components:    Troponin I 0.069 (*)     All other components within normal limits   B-TYPE NATRIURETIC PEPTIDE   CK     EKG Readings: (Independently Interpreted)   Rhythm: Sinus Bradycardia. Heart Rate: 47. Ectopy: No Ectopy. Conduction: Normal. ST Segments: Normal ST Segments. T Waves: Normal. Clinical Impression: Normal Sinus Rhythm       Imaging Results          X-Ray Chest AP Portable (Final result)  Result time 07/18/19 16:18:02    Final result by Tim Keyes III, MD (07/18/19 16:18:02)                 Impression:      No acute abnormality identified in the chest.  Stable chronic findings, as above.      Electronically signed by: Tim Keyes MD  Date:    07/18/2019  Time:    16:18             Narrative:    EXAMINATION:  XR CHEST AP PORTABLE    CLINICAL HISTORY:  chest pain;    COMPARISON:  11/19/2018    FINDINGS:  Heart size is upper limits of normal, unchanged.  There is stable moderate elevation of the left diaphragm with underlying colonic interposition..  The lungs appear clear of active disease. No acute appearing infiltrate, pleural effusion or pneumothorax identified.                                     ED Vital Signs:  Vitals:    07/18/19 1559 07/18/19 1602 07/18/19 1633 07/18/19 1702   BP: (!) 141/64  129/60 127/71   Pulse: 66 (!) 51 (!) 48    Resp: (!) 22  (!) 24    Temp: 98.5 °F (36.9 °C)      TempSrc: Oral      SpO2: 100%  100%    Weight: 83.5 kg (184 lb 1.4 oz)      Height: 5' 3" (1.6 m)       07/18/19 1703 07/18/19 1732 07/18/19 1803 07/18/19 1831   BP:  128/60 134/60 126/75   Pulse: (!) 48 (!) 49 (!) 48 (!) 51   Resp: 20 20 (!) 24 (!) 22   Temp:  98.6 °F (37 °C)     TempSrc:  Oral     SpO2: 99% 100% 99% 100%   Weight:       Height:             Abnormal Lab Results:  Labs Reviewed   CBC W/ AUTO " DIFFERENTIAL - Abnormal; Notable for the following components:       Result Value    Hemoglobin 11.6 (*)     Hematocrit 36.2 (*)     Mean Corpuscular Hemoglobin 26.4 (*)     All other components within normal limits   COMPREHENSIVE METABOLIC PANEL - Abnormal; Notable for the following components:    Anion Gap 7 (*)     eGFR if  59.2 (*)     eGFR if non  51.4 (*)     All other components within normal limits   URINALYSIS, REFLEX TO URINE CULTURE - Abnormal; Notable for the following components:    Appearance, UA Hazy (*)     Occult Blood UA Trace (*)     All other components within normal limits    Narrative:     Preferred Collection Type->Urine, Clean Catch   TROPONIN I - Abnormal; Notable for the following components:    Troponin I 0.061 (*)     All other components within normal limits   TROPONIN I - Abnormal; Notable for the following components:    Troponin I 0.069 (*)     All other components within normal limits   B-TYPE NATRIURETIC PEPTIDE   CK          All Lab Results:  Results for orders placed or performed during the hospital encounter of 07/18/19   CBC auto differential   Result Value Ref Range    WBC 9.63 3.90 - 12.70 K/uL    RBC 4.40 4.00 - 5.40 M/uL    Hemoglobin 11.6 (L) 12.0 - 16.0 g/dL    Hematocrit 36.2 (L) 37.0 - 48.5 %    Mean Corpuscular Volume 82 82 - 98 fL    Mean Corpuscular Hemoglobin 26.4 (L) 27.0 - 31.0 pg    Mean Corpuscular Hemoglobin Conc 32.0 32.0 - 36.0 g/dL    RDW 13.6 11.5 - 14.5 %    Platelets 264 150 - 350 K/uL    MPV 10.3 9.2 - 12.9 fL    Gran # (ANC) 5.5 1.8 - 7.7 K/uL    Lymph # 3.2 1.0 - 4.8 K/uL    Mono # 0.7 0.3 - 1.0 K/uL    Eos # 0.2 0.0 - 0.5 K/uL    Baso # 0.02 0.00 - 0.20 K/uL    Gran% 56.8 38.0 - 73.0 %    Lymph% 33.6 18.0 - 48.0 %    Mono% 7.5 4.0 - 15.0 %    Eosinophil% 1.9 0.0 - 8.0 %    Basophil% 0.2 0.0 - 1.9 %    Differential Method Automated    Comprehensive metabolic panel   Result Value Ref Range    Sodium 139 136 - 145 mmol/L     Potassium 3.8 3.5 - 5.1 mmol/L    Chloride 105 95 - 110 mmol/L    CO2 27 23 - 29 mmol/L    Glucose 92 70 - 110 mg/dL    BUN, Bld 15 6 - 20 mg/dL    Creatinine 1.2 0.5 - 1.4 mg/dL    Calcium 9.4 8.7 - 10.5 mg/dL    Total Protein 7.1 6.0 - 8.4 g/dL    Albumin 3.8 3.5 - 5.2 g/dL    Total Bilirubin 0.5 0.1 - 1.0 mg/dL    Alkaline Phosphatase 68 55 - 135 U/L    AST 14 10 - 40 U/L    ALT 13 10 - 44 U/L    Anion Gap 7 (L) 8 - 16 mmol/L    eGFR if African American 59.2 (A) >60 mL/min/1.73 m^2    eGFR if non  51.4 (A) >60 mL/min/1.73 m^2   Urinalysis, Reflex to Urine Culture Urine, Clean Catch   Result Value Ref Range    Specimen UA Urine, Clean Catch     Color, UA Yellow Yellow, Straw, Rachel    Appearance, UA Hazy (A) Clear    pH, UA 7.0 5.0 - 8.0    Specific Gravity, UA 1.020 1.005 - 1.030    Protein, UA Negative Negative    Glucose, UA Negative Negative    Ketones, UA Negative Negative    Bilirubin (UA) Negative Negative    Occult Blood UA Trace (A) Negative    Nitrite, UA Negative Negative    Urobilinogen, UA Negative <2.0 EU/dL    Leukocytes, UA Negative Negative   Brain natriuretic peptide   Result Value Ref Range    BNP 20 0 - 99 pg/mL   CK   Result Value Ref Range     20 - 180 U/L   Troponin I   Result Value Ref Range    Troponin I 0.061 (H) 0.000 - 0.026 ng/mL   Troponin I   Result Value Ref Range    Troponin I 0.069 (H) 0.000 - 0.026 ng/mL           Imaging Results:  Imaging Results          X-Ray Chest AP Portable (Final result)  Result time 07/18/19 16:18:02    Final result by Tim Keyes III, MD (07/18/19 16:18:02)                 Impression:      No acute abnormality identified in the chest.  Stable chronic findings, as above.      Electronically signed by: Tim Keyes MD  Date:    07/18/2019  Time:    16:18             Narrative:    EXAMINATION:  XR CHEST AP PORTABLE    CLINICAL HISTORY:  chest pain;    COMPARISON:  11/19/2018    FINDINGS:  Heart size is upper limits of normal,  unchanged.  There is stable moderate elevation of the left diaphragm with underlying colonic interposition..  The lungs appear clear of active disease. No acute appearing infiltrate, pleural effusion or pneumothorax identified.                               Results for orders placed or performed during the hospital encounter of 07/18/19   CBC auto differential   Result Value Ref Range    WBC 9.63 3.90 - 12.70 K/uL    RBC 4.40 4.00 - 5.40 M/uL    Hemoglobin 11.6 (L) 12.0 - 16.0 g/dL    Hematocrit 36.2 (L) 37.0 - 48.5 %    Mean Corpuscular Volume 82 82 - 98 fL    Mean Corpuscular Hemoglobin 26.4 (L) 27.0 - 31.0 pg    Mean Corpuscular Hemoglobin Conc 32.0 32.0 - 36.0 g/dL    RDW 13.6 11.5 - 14.5 %    Platelets 264 150 - 350 K/uL    MPV 10.3 9.2 - 12.9 fL    Gran # (ANC) 5.5 1.8 - 7.7 K/uL    Lymph # 3.2 1.0 - 4.8 K/uL    Mono # 0.7 0.3 - 1.0 K/uL    Eos # 0.2 0.0 - 0.5 K/uL    Baso # 0.02 0.00 - 0.20 K/uL    Gran% 56.8 38.0 - 73.0 %    Lymph% 33.6 18.0 - 48.0 %    Mono% 7.5 4.0 - 15.0 %    Eosinophil% 1.9 0.0 - 8.0 %    Basophil% 0.2 0.0 - 1.9 %    Differential Method Automated    Comprehensive metabolic panel   Result Value Ref Range    Sodium 139 136 - 145 mmol/L    Potassium 3.8 3.5 - 5.1 mmol/L    Chloride 105 95 - 110 mmol/L    CO2 27 23 - 29 mmol/L    Glucose 92 70 - 110 mg/dL    BUN, Bld 15 6 - 20 mg/dL    Creatinine 1.2 0.5 - 1.4 mg/dL    Calcium 9.4 8.7 - 10.5 mg/dL    Total Protein 7.1 6.0 - 8.4 g/dL    Albumin 3.8 3.5 - 5.2 g/dL    Total Bilirubin 0.5 0.1 - 1.0 mg/dL    Alkaline Phosphatase 68 55 - 135 U/L    AST 14 10 - 40 U/L    ALT 13 10 - 44 U/L    Anion Gap 7 (L) 8 - 16 mmol/L    eGFR if African American 59.2 (A) >60 mL/min/1.73 m^2    eGFR if non  51.4 (A) >60 mL/min/1.73 m^2   Urinalysis, Reflex to Urine Culture Urine, Clean Catch   Result Value Ref Range    Specimen UA Urine, Clean Catch     Color, UA Yellow Yellow, Straw, Rachel    Appearance, UA Hazy (A) Clear    pH, UA 7.0 5.0 - 8.0     Specific Gravity, UA 1.020 1.005 - 1.030    Protein, UA Negative Negative    Glucose, UA Negative Negative    Ketones, UA Negative Negative    Bilirubin (UA) Negative Negative    Occult Blood UA Trace (A) Negative    Nitrite, UA Negative Negative    Urobilinogen, UA Negative <2.0 EU/dL    Leukocytes, UA Negative Negative   Brain natriuretic peptide   Result Value Ref Range    BNP 20 0 - 99 pg/mL   CK   Result Value Ref Range     20 - 180 U/L   Troponin I   Result Value Ref Range    Troponin I 0.061 (H) 0.000 - 0.026 ng/mL   Troponin I   Result Value Ref Range    Troponin I 0.069 (H) 0.000 - 0.026 ng/mL         The Emergency Provider reviewed the vital signs and test results, which are outlined above.    ED Discussions:  5:15 PM: Re-evaluated pt. Pt is resting comfortably and is in no acute distress, pain has resolved. Will observe in ED for repeat troponin.  D/w pt all pertinent results. D/w pt any concerns expressed at this time. Answered all questions. Pt expresses understanding at this time.                         ED Course as of Jul 18 1956   Thu Jul 18, 2019 1951 I discussed the lab results with the patient.  Her troponin is stable, and is likely always slightly elevated due to subendocardial ischemia from her heart disease.  I offered the patient observation for further troponin draws, but she refused.  She says that she has had this in the past and would prefer to just follow up with her cardiologist tomorrow.  Patient is hemodynamically stable, and has some mild bradycardia at this time but she is asymptomatic from this.  She is chest pain-free at this time, and is feeling better. Would rather follow up with cardiologist in the AM.     [BA]   1952 7:52 PM Reassessment: I reassessed the pt.  The pt is resting comfortably and is NAD.  Pt states their sx have improved.I Discussed test results, shared treatment plan, specific conditions for return, and the need for f/u. I  Answered their questions  at this time.  Pt understands and agrees to the plan.  The pt has remained hemodynamically stable through ED course and is stable for discharge.       [BA]      ED Course User Index  [BA] Lloyd Melendez MD     Clinical Impression:       ICD-10-CM ICD-9-CM   1. Troponin level elevated R74.8 790.6   2. Chest pain R07.9 786.50       Disposition:   Disposition: Discharged  Condition: Stable                        Lloyd Melendez MD  07/18/19 1956

## 2019-07-18 NOTE — ED NOTES
Pt reports that she is feeling much better, sinus derik on cardiac monitor, resp e/u, rates pain 2/10, nad. Pt is aware of poc. Will continue to monitor.

## 2019-07-18 NOTE — ED NOTES
Pt aaoX4, resp e/u, sinus derik on cardiac monitor, rates pain 6/10, nad. Md has updated pt on test results and poc. Pt has verbalized understanding. Will continue to monitor.

## 2019-10-10 ENCOUNTER — HOSPITAL ENCOUNTER (EMERGENCY)
Facility: HOSPITAL | Age: 54
Discharge: HOME | End: 2019-10-11
Attending: EMERGENCY MEDICINE
Payer: COMMERCIAL

## 2019-10-10 DIAGNOSIS — R10.30 LOWER ABDOMINAL PAIN: ICD-10-CM

## 2019-10-10 DIAGNOSIS — R00.1 SINUS BRADYCARDIA: ICD-10-CM

## 2019-10-10 DIAGNOSIS — R07.9 CHEST PAIN: ICD-10-CM

## 2019-10-10 DIAGNOSIS — R11.0 NAUSEA: ICD-10-CM

## 2019-10-10 DIAGNOSIS — K59.00 CONSTIPATION, UNSPECIFIED CONSTIPATION TYPE: Primary | ICD-10-CM

## 2019-10-10 PROBLEM — E03.4 ACQUIRED ATROPHY OF THYROID: Status: ACTIVE | Noted: 2019-05-24

## 2019-10-10 PROBLEM — I25.10 CORONARY ATHEROSCLEROSIS: Status: ACTIVE | Noted: 2019-05-24

## 2019-10-10 PROBLEM — E55.9 VITAMIN D DEFICIENCY: Status: ACTIVE | Noted: 2019-05-24

## 2019-10-10 PROBLEM — G45.9 TRANSIENT ISCHEMIC ATTACK: Status: ACTIVE | Noted: 2019-05-24

## 2019-10-10 PROBLEM — E88.810 METABOLIC SYNDROME X: Status: ACTIVE | Noted: 2019-05-24

## 2019-10-10 PROBLEM — F33.9 RECURRENT MAJOR DEPRESSION: Status: ACTIVE | Noted: 2019-05-24

## 2019-10-10 LAB
ALBUMIN SERPL BCP-MCNC: 3.8 G/DL (ref 3.5–5.2)
ALP SERPL-CCNC: 74 U/L (ref 55–135)
ALT SERPL W/O P-5'-P-CCNC: 15 U/L (ref 10–44)
ANION GAP SERPL CALC-SCNC: 7 MMOL/L (ref 8–16)
AST SERPL-CCNC: 17 U/L (ref 10–40)
BACTERIA #/AREA URNS AUTO: ABNORMAL /HPF
BASOPHILS # BLD AUTO: 0.04 K/UL (ref 0–0.2)
BASOPHILS NFR BLD: 0.4 % (ref 0–1.9)
BILIRUB SERPL-MCNC: 0.2 MG/DL (ref 0.1–1)
BILIRUB UR QL STRIP: NEGATIVE
BNP SERPL-MCNC: 17 PG/ML (ref 0–99)
BUN SERPL-MCNC: 12 MG/DL (ref 6–20)
CALCIUM SERPL-MCNC: 9.8 MG/DL (ref 8.7–10.5)
CHLORIDE SERPL-SCNC: 105 MMOL/L (ref 95–110)
CLARITY UR REFRACT.AUTO: CLEAR
CO2 SERPL-SCNC: 26 MMOL/L (ref 23–29)
COLOR UR AUTO: YELLOW
CREAT SERPL-MCNC: 0.8 MG/DL (ref 0.5–1.4)
DIFFERENTIAL METHOD: ABNORMAL
EOSINOPHIL # BLD AUTO: 0.2 K/UL (ref 0–0.5)
EOSINOPHIL NFR BLD: 2 % (ref 0–8)
ERYTHROCYTE [DISTWIDTH] IN BLOOD BY AUTOMATED COUNT: 13.6 % (ref 11.5–14.5)
EST. GFR  (AFRICAN AMERICAN): >60 ML/MIN/1.73 M^2
EST. GFR  (NON AFRICAN AMERICAN): >60 ML/MIN/1.73 M^2
GLUCOSE SERPL-MCNC: 99 MG/DL (ref 70–110)
GLUCOSE UR QL STRIP: NEGATIVE
HCT VFR BLD AUTO: 37.3 % (ref 37–48.5)
HGB BLD-MCNC: 11.5 G/DL (ref 12–16)
HGB UR QL STRIP: ABNORMAL
IMM GRANULOCYTES # BLD AUTO: 0.05 K/UL (ref 0–0.04)
IMM GRANULOCYTES NFR BLD AUTO: 0.5 % (ref 0–0.5)
KETONES UR QL STRIP: NEGATIVE
LEUKOCYTE ESTERASE UR QL STRIP: NEGATIVE
LIPASE SERPL-CCNC: 42 U/L (ref 4–60)
LYMPHOCYTES # BLD AUTO: 4 K/UL (ref 1–4.8)
LYMPHOCYTES NFR BLD: 36.3 % (ref 18–48)
MCH RBC QN AUTO: 25.8 PG (ref 27–31)
MCHC RBC AUTO-ENTMCNC: 30.8 G/DL (ref 32–36)
MCV RBC AUTO: 84 FL (ref 82–98)
MICROSCOPIC COMMENT: ABNORMAL
MONOCYTES # BLD AUTO: 0.8 K/UL (ref 0.3–1)
MONOCYTES NFR BLD: 7.3 % (ref 4–15)
NEUTROPHILS # BLD AUTO: 5.9 K/UL (ref 1.8–7.7)
NEUTROPHILS NFR BLD: 53.5 % (ref 38–73)
NITRITE UR QL STRIP: NEGATIVE
NRBC BLD-RTO: 0 /100 WBC
PH UR STRIP: 6 [PH] (ref 5–8)
PLATELET # BLD AUTO: 255 K/UL (ref 150–350)
PMV BLD AUTO: 9.9 FL (ref 9.2–12.9)
POTASSIUM SERPL-SCNC: 4.2 MMOL/L (ref 3.5–5.1)
PROT SERPL-MCNC: 7.3 G/DL (ref 6–8.4)
PROT UR QL STRIP: NEGATIVE
RBC # BLD AUTO: 4.46 M/UL (ref 4–5.4)
RBC #/AREA URNS AUTO: 2 /HPF (ref 0–4)
SODIUM SERPL-SCNC: 138 MMOL/L (ref 136–145)
SP GR UR STRIP: 1.02 (ref 1–1.03)
SQUAMOUS #/AREA URNS AUTO: 2 /HPF
TROPONIN I SERPL DL<=0.01 NG/ML-MCNC: 0.07 NG/ML (ref 0–0.03)
URN SPEC COLLECT METH UR: ABNORMAL
UROBILINOGEN UR STRIP-ACNC: <2 EU/DL
WBC # BLD AUTO: 11.04 K/UL (ref 3.9–12.7)
WBC #/AREA URNS AUTO: 1 /HPF (ref 0–5)

## 2019-10-10 PROCEDURE — 25000003 PHARM REV CODE 250: Mod: ER | Performed by: NURSE PRACTITIONER

## 2019-10-10 PROCEDURE — 83880 ASSAY OF NATRIURETIC PEPTIDE: CPT | Mod: ER

## 2019-10-10 PROCEDURE — 85025 COMPLETE CBC W/AUTO DIFF WBC: CPT | Mod: ER

## 2019-10-10 PROCEDURE — 83690 ASSAY OF LIPASE: CPT | Mod: ER

## 2019-10-10 PROCEDURE — 84484 ASSAY OF TROPONIN QUANT: CPT | Mod: 91,ER

## 2019-10-10 PROCEDURE — 96374 THER/PROPH/DIAG INJ IV PUSH: CPT | Mod: ER

## 2019-10-10 PROCEDURE — 93010 ELECTROCARDIOGRAM REPORT: CPT | Mod: ,,, | Performed by: INTERNAL MEDICINE

## 2019-10-10 PROCEDURE — 63600175 PHARM REV CODE 636 W HCPCS: Mod: ER | Performed by: NURSE PRACTITIONER

## 2019-10-10 PROCEDURE — 80053 COMPREHEN METABOLIC PANEL: CPT | Mod: ER

## 2019-10-10 PROCEDURE — 93005 ELECTROCARDIOGRAM TRACING: CPT | Mod: ER

## 2019-10-10 PROCEDURE — 81000 URINALYSIS NONAUTO W/SCOPE: CPT | Mod: ER

## 2019-10-10 PROCEDURE — 93010 EKG 12-LEAD: ICD-10-PCS | Mod: ,,, | Performed by: INTERNAL MEDICINE

## 2019-10-10 PROCEDURE — 84484 ASSAY OF TROPONIN QUANT: CPT | Mod: ER

## 2019-10-10 PROCEDURE — 99900035 HC TECH TIME PER 15 MIN (STAT): Mod: ER

## 2019-10-10 PROCEDURE — 99285 EMERGENCY DEPT VISIT HI MDM: CPT | Mod: 25,ER

## 2019-10-10 RX ORDER — ASPIRIN 325 MG
325 TABLET ORAL
Status: COMPLETED | OUTPATIENT
Start: 2019-10-10 | End: 2019-10-10

## 2019-10-10 RX ORDER — CHOLECALCIFEROL (VITAMIN D3) 25 MCG
1000 TABLET ORAL DAILY
COMMUNITY
End: 2019-10-10

## 2019-10-10 RX ORDER — MORPHINE SULFATE 4 MG/ML
4 INJECTION, SOLUTION INTRAMUSCULAR; INTRAVENOUS
Status: DISCONTINUED | OUTPATIENT
Start: 2019-10-10 | End: 2019-10-10

## 2019-10-10 RX ORDER — AMLODIPINE BESYLATE 5 MG/1
5 TABLET ORAL DAILY
Status: ON HOLD | COMMUNITY
End: 2020-03-06 | Stop reason: HOSPADM

## 2019-10-10 RX ORDER — KETOROLAC TROMETHAMINE 10 MG/1
10 TABLET, FILM COATED ORAL
Status: COMPLETED | OUTPATIENT
Start: 2019-10-10 | End: 2019-10-10

## 2019-10-10 RX ORDER — ONDANSETRON 2 MG/ML
4 INJECTION INTRAMUSCULAR; INTRAVENOUS
Status: COMPLETED | OUTPATIENT
Start: 2019-10-10 | End: 2019-10-10

## 2019-10-10 RX ORDER — ASPIRIN 325 MG
325 TABLET ORAL DAILY
Status: ON HOLD | COMMUNITY
Start: 2015-01-22 | End: 2023-05-17 | Stop reason: CLARIF

## 2019-10-10 RX ORDER — PANTOPRAZOLE SODIUM 40 MG/1
40 TABLET, DELAYED RELEASE ORAL DAILY
COMMUNITY
End: 2019-10-10 | Stop reason: CLARIF

## 2019-10-10 RX ADMIN — KETOROLAC TROMETHAMINE 10 MG: 10 TABLET, FILM COATED ORAL at 10:10

## 2019-10-10 RX ADMIN — ASPIRIN 325 MG ORAL TABLET 325 MG: 325 PILL ORAL at 09:10

## 2019-10-10 RX ADMIN — ONDANSETRON 4 MG: 2 INJECTION INTRAMUSCULAR; INTRAVENOUS at 10:10

## 2019-10-11 VITALS
OXYGEN SATURATION: 98 % | WEIGHT: 187.19 LBS | BODY MASS INDEX: 33.17 KG/M2 | RESPIRATION RATE: 17 BRPM | HEART RATE: 48 BPM | TEMPERATURE: 98 F | DIASTOLIC BLOOD PRESSURE: 59 MMHG | HEIGHT: 63 IN | SYSTOLIC BLOOD PRESSURE: 126 MMHG

## 2019-10-11 LAB — TROPONIN I SERPL DL<=0.01 NG/ML-MCNC: 0.08 NG/ML (ref 0–0.03)

## 2019-10-11 NOTE — ED NOTES
Pt ambulatory at time of D/C, stating pain has resolved and no longer untolerable, smiling, laughing, playful.  D/c packet reviewed, pt instructed to follow up with cardiology, and pcp.

## 2019-10-11 NOTE — ED NOTES
Patient complains of lower abd pain.    Level of Consciousness: Patient is awake, alert, and oriented to person, place, time, and situation. Speech is clear.   HEENT: Symmetrical face, PERRLA, moist mucous membranes, no congestion/drainage, no JVD, pt able to swallow.  Appearance: Hygiene and clothing are both intact and appropriate.   Skin: Skin is warm, dry, and intact. Skin is of normal color, free of any skin breakdown. Mucus membranes pink and moist.   Musculoskeletal: Moves all extremities well. Full active ROM. No deformities noted. Pt sitting in wheelchair for comfort due to abd pain, pt able to stand and ambulate without assistive devices.  Respiratory: Airway open and patent. Respirations equal and unlabored. Lung sounds clear upon auscultation. Patient denies any SOB.   Cardiac:  No peripheral edema noted to bilateral lower extremities. Denies any chest pain or discomfort.   GI: Abdomen soft, non-tender. Hypoactive bowel sounds in all quadrants x 4. No distention noted. Pt reports nausea. Last BM this morning.  : Denies any problem with urination.  Neurological: Normal sensation reported to all extremities. Symmetrical expressions noted to face. No obvious neurological deficits noted.   Psychosocial: Patient is calm and cooperative, appropriate to situation. Family is involved in patient care and present in ED with pt today.    Patient informed of plan of care, verbalizes understanding, and has no questions or concern at this time.

## 2019-10-11 NOTE — ED PROVIDER NOTES
"Encounter Date: 10/10/2019       History     Chief Complaint   Patient presents with    Abdominal Pain     "legs feel heavy", Nausea,     The history is provided by the patient.   Abdominal Pain   The current episode started several hours ago (at 1200 hours today). The onset of the illness was gradual. Pain location: "bottom of stomach". The abdominal pain does not radiate. The severity of the abdominal pain is 10/10. The other symptoms of the illness include shortness of breath (intermittent) and nausea. The other symptoms of the illness do not include fever, fatigue, jaundice, vomiting, diarrhea, dysuria or hematemesis.   Nausea began today.   The patient's medical history does not include COPD or asthma.   Symptoms associated with the illness do not include chills, anorexia, diaphoresis, heartburn, constipation, frequency or back pain. Significant associated medical issues include GERD and cardiac disease.   Chest Pain   The current episode started today (since 1200 hours today). Chest pain occurs intermittently. Progression since onset: waxing and waning. At its most intense, the chest pain is at 10/10. The chest pain is currently at 10/10. The quality of the pain is described as pressure-like and heavy. Primary symptoms include shortness of breath (intermittent), abdominal pain and nausea. Pertinent negatives for primary symptoms include no fever, no fatigue, no cough, no wheezing, no palpitations, no vomiting and no dizziness.   The shortness of breath began today. The patient's medical history does not include COPD or asthma.   The abdominal pain began today. The abdominal pain has been unchanged since its onset. Pain location: "bottom of stomach" The severity of the abdominal pain is 10/10.   Nausea began today.   Pertinent negatives for associated symptoms include no diaphoresis, no near-syncope, no numbness and no weakness.   Her past medical history is significant for CAD, hyperlipidemia, hypertension, " "MI, strokes and thyroid problem.   Pertinent negatives for past medical history include no COPD.   Her family medical history is significant for CAD, diabetes, heart disease, hyperlipidemia and hypertension.   Ms. Ocoha also has complaints of her "legs feeling heavy". Last oral intake (meal) was this morning at approximately 1000 hours and included a slice of pizza from [employer]. She denies any further complaints or concerns.          PCP:     Rosita Mcgrath MD        Review of patient's allergies indicates:  No Known Allergies  Past Medical History:   Diagnosis Date    Coronary atherosclerosis 2019    Depression     GERD (gastroesophageal reflux disease)     High cholesterol     Hyperlipidemia     Hypertension     Hypothyroidism     Metabolic syndrome X 2019    MI (myocardial infarction)     Stroke     Vitamin D deficiency 2019     Past Surgical History:   Procedure Laterality Date    ANGIOPLASTY       SECTION      x 2    HYSTERECTOMY      OOPHORECTOMY       History reviewed. No pertinent family history.  Social History     Tobacco Use    Smoking status: Never Smoker    Smokeless tobacco: Never Used   Substance Use Topics    Alcohol use: Yes     Comment: occasionally    Drug use: No     Review of Systems   Constitutional: Negative for chills, diaphoresis, fatigue and fever.        Positive for "heaviness" to chest, stomach, and legs (bilaterally).    HENT: Negative for congestion and sore throat.    Eyes: Negative for photophobia and visual disturbance.   Respiratory: Positive for chest tightness and shortness of breath (intermittent). Negative for cough and wheezing.    Cardiovascular: Positive for chest pain. Negative for palpitations, leg swelling and near-syncope.   Gastrointestinal: Positive for abdominal pain and nausea. Negative for anorexia, constipation, diarrhea, heartburn, hematemesis, jaundice and vomiting.   Endocrine: Negative for polydipsia, polyphagia " and polyuria.   Genitourinary: Negative for difficulty urinating, dysuria and frequency.   Musculoskeletal: Negative for back pain and neck pain.   Skin: Negative for color change and rash.   Neurological: Negative for dizziness, syncope, speech difficulty, weakness, light-headedness, numbness and headaches.   Hematological: Does not bruise/bleed easily.   Psychiatric/Behavioral: Negative for agitation and confusion.       Physical Exam     Initial Vitals [10/10/19 2035]   BP Pulse Resp Temp SpO2   135/60 (!) 54 18 98 °F (36.7 °C) 98 %      MAP       --         Physical Exam    Nursing note and vitals reviewed.  Constitutional: She appears well-developed and well-nourished. She is cooperative. She does not appear ill. No distress.   HENT:   Head: Normocephalic and atraumatic.   Right Ear: Hearing and external ear normal.   Left Ear: Hearing and external ear normal.   Nose: Nose normal.   Mouth/Throat: Uvula is midline, oropharynx is clear and moist and mucous membranes are normal.   Eyes: Conjunctivae, EOM and lids are normal. Pupils are equal, round, and reactive to light.   Neck: Trachea normal and normal range of motion. Neck supple.   Cardiovascular: Regular rhythm, intact distal pulses and normal pulses. Bradycardia present.    Pulmonary/Chest: Effort normal and breath sounds normal. No accessory muscle usage. No respiratory distress. She has no decreased breath sounds. She has no wheezes. She has no rhonchi. She has no rales.   Abdominal: Soft. Normal appearance and bowel sounds are normal. She exhibits no distension and no mass. There is tenderness in the right lower quadrant and left lower quadrant. There is no rebound and no guarding.   Musculoskeletal: Normal range of motion. She exhibits no edema or tenderness.   Neurological: She is alert and oriented to person, place, and time. She has normal strength. No sensory deficit. Gait normal. GCS eye subscore is 4. GCS verbal subscore is 5. GCS motor subscore  "is 6.   Neurovascular intact to all extremities.    Skin: Skin is warm, dry and intact. Capillary refill takes less than 2 seconds. No rash noted.   Normal color and turgor.    Psychiatric: She has a normal mood and affect. Her speech is normal and behavior is normal. Cognition and memory are normal.         ED Course   Procedures    ED ONGOING VITALS:  Vitals:    10/10/19 2035 10/10/19 2109 10/10/19 2119 10/10/19 2120   BP: 135/60  (!) 148/70    Pulse: (!) 54 81 (!) 51 (!) 50   Resp: 18  16    Temp: 98 °F (36.7 °C)      TempSrc: Oral      SpO2: 98%  99%    Weight: 84.9 kg (187 lb 2.7 oz)      Height: 5' 3" (1.6 m)       10/10/19 2132 10/10/19 2146 10/10/19 2201 10/10/19 2216   BP: 133/60 133/63 134/61 137/67   Pulse: (!) 48 (!) 52 (!) 50 (!) 50   Resp: 18 18 17 20   Temp:       TempSrc:       SpO2: 99% 99% 99% 99%   Weight:       Height:        10/10/19 2232 10/10/19 2246 10/10/19 2300 10/10/19 2312   BP: 133/65 (!) 126/59 (!) 122/57    Pulse: (!) 49 (!) 49 (!) 49 (!) 48   Resp: 20 17 19 18   Temp:       TempSrc:       SpO2: 99% 99% 99% 99%   Weight:       Height:        10/10/19 2338 10/10/19 2346 10/11/19 0001   BP: (!) 155/68 132/61 132/64   Pulse: (!) 52 (!) 46 (!) 46   Resp: 16 17 (!) 23   Temp:      TempSrc:      SpO2: 99% 99% 99%   Weight:      Height:            ABNORMAL LAB VALUES:  Labs Reviewed   CBC W/ AUTO DIFFERENTIAL - Abnormal; Notable for the following components:       Result Value    Hemoglobin 11.5 (*)     Mean Corpuscular Hemoglobin 25.8 (*)     Mean Corpuscular Hemoglobin Conc 30.8 (*)     Immature Grans (Abs) 0.05 (*)     All other components within normal limits   COMPREHENSIVE METABOLIC PANEL - Abnormal; Notable for the following components:    Anion Gap 7 (*)     All other components within normal limits   URINALYSIS, REFLEX TO URINE CULTURE - Abnormal; Notable for the following components:    Occult Blood UA 1+ (*)     All other components within normal limits    Narrative:     Preferred " Collection Type->Urine, Clean Catch   TROPONIN I - Abnormal; Notable for the following components:    Troponin I 0.074 (*)     All other components within normal limits   URINALYSIS MICROSCOPIC - Abnormal; Notable for the following components:    Bacteria Few (*)     All other components within normal limits    Narrative:     Preferred Collection Type->Urine, Clean Catch   TROPONIN I - Abnormal; Notable for the following components:    Troponin I 0.080 (*)     All other components within normal limits    Narrative:     2ND TROPONIN.   LIPASE   B-TYPE NATRIURETIC PEPTIDE         ALL LAB VALUES:  Results for orders placed or performed during the hospital encounter of 10/10/19   CBC W/ AUTO DIFFERENTIAL   Result Value Ref Range    WBC 11.04 3.90 - 12.70 K/uL    RBC 4.46 4.00 - 5.40 M/uL    Hemoglobin 11.5 (L) 12.0 - 16.0 g/dL    Hematocrit 37.3 37.0 - 48.5 %    Mean Corpuscular Volume 84 82 - 98 fL    Mean Corpuscular Hemoglobin 25.8 (L) 27.0 - 31.0 pg    Mean Corpuscular Hemoglobin Conc 30.8 (L) 32.0 - 36.0 g/dL    RDW 13.6 11.5 - 14.5 %    Platelets 255 150 - 350 K/uL    MPV 9.9 9.2 - 12.9 fL    Immature Granulocytes 0.5 0.0 - 0.5 %    Gran # (ANC) 5.9 1.8 - 7.7 K/uL    Immature Grans (Abs) 0.05 (H) 0.00 - 0.04 K/uL    Lymph # 4.0 1.0 - 4.8 K/uL    Mono # 0.8 0.3 - 1.0 K/uL    Eos # 0.2 0.0 - 0.5 K/uL    Baso # 0.04 0.00 - 0.20 K/uL    nRBC 0 0 /100 WBC    Gran% 53.5 38.0 - 73.0 %    Lymph% 36.3 18.0 - 48.0 %    Mono% 7.3 4.0 - 15.0 %    Eosinophil% 2.0 0.0 - 8.0 %    Basophil% 0.4 0.0 - 1.9 %    Differential Method Automated    Comp. Metabolic Panel   Result Value Ref Range    Sodium 138 136 - 145 mmol/L    Potassium 4.2 3.5 - 5.1 mmol/L    Chloride 105 95 - 110 mmol/L    CO2 26 23 - 29 mmol/L    Glucose 99 70 - 110 mg/dL    BUN, Bld 12 6 - 20 mg/dL    Creatinine 0.8 0.5 - 1.4 mg/dL    Calcium 9.8 8.7 - 10.5 mg/dL    Total Protein 7.3 6.0 - 8.4 g/dL    Albumin 3.8 3.5 - 5.2 g/dL    Total Bilirubin 0.2 0.1 - 1.0  mg/dL    Alkaline Phosphatase 74 55 - 135 U/L    AST 17 10 - 40 U/L    ALT 15 10 - 44 U/L    Anion Gap 7 (L) 8 - 16 mmol/L    eGFR if African American >60.0 >60 mL/min/1.73 m^2    eGFR if non African American >60.0 >60 mL/min/1.73 m^2   Lipase   Result Value Ref Range    Lipase 42 4 - 60 U/L   Urinalysis, Reflex to Urine Culture Urine, Clean Catch   Result Value Ref Range    Specimen UA Urine, Clean Catch     Color, UA Yellow Yellow, Straw, Rachel    Appearance, UA Clear Clear    pH, UA 6.0 5.0 - 8.0    Specific Gravity, UA 1.025 1.005 - 1.030    Protein, UA Negative Negative    Glucose, UA Negative Negative    Ketones, UA Negative Negative    Bilirubin (UA) Negative Negative    Occult Blood UA 1+ (A) Negative    Nitrite, UA Negative Negative    Urobilinogen, UA <2.0 <2.0 EU/dL    Leukocytes, UA Negative Negative   Troponin I #1   Result Value Ref Range    Troponin I 0.074 (H) 0.000 - 0.026 ng/mL   B-Type natriuretic peptide (BNP)   Result Value Ref Range    BNP 17 0 - 99 pg/mL   Urinalysis Microscopic   Result Value Ref Range    RBC, UA 2 0 - 4 /hpf    WBC, UA 1 0 - 5 /hpf    Bacteria Few (A) None-Occ /hpf    Squam Epithel, UA 2 /hpf    Microscopic Comment SEE COMMENT    Troponin I   Result Value Ref Range    Troponin I 0.080 (H) 0.000 - 0.026 ng/mL         RADIOLOGY STUDIES:  Imaging Results          X-Ray Abdomen Flat And Erect (Final result)  Result time 10/10/19 23:33:07    Final result by Pierre Tomas MD (10/10/19 23:33:07)                 Impression:      Moderate constipation      Electronically signed by: Pierre Tomas MD  Date:    10/10/2019  Time:    23:33             Narrative:    EXAMINATION:  XR ABDOMEN FLAT AND ERECT    CLINICAL HISTORY:  Lower abdominal pain, unspecified    TECHNIQUE:  3 View of the abdomen was performed.    COMPARISON:  None    FINDINGS:  Nonobstructive bowel gas pattern.  Moderate constipation.    No obvious free air.  No portal venous gas.    No acute fracture. Mild DJD. Lung  bases demonstrate mild left lower lobe atelectasis.                               X-Ray Chest PA And Lateral (Final result)  Result time 10/10/19 23:32:31    Final result by Pierre Tomas MD (10/10/19 23:32:31)                 Impression:      No acute process seen.      Electronically signed by: Pierre Tomas MD  Date:    10/10/2019  Time:    23:32             Narrative:    EXAMINATION:  XR CHEST PA AND LATERAL    CLINICAL HISTORY:  Chest pain, unspecified    COMPARISON:  07/18/2019    FINDINGS:  Cardiac silhouette is normal. Aorta demonstrates atherosclerotic disease. The lungs demonstrate no evidence of active disease.  No evidence of pleural effusion or pneumothorax.  Bones appear intact.   Elevated left hemidiaphragm with atelectasis left lower lobe.  Moderate constipation.                                  The above vital signs and test results, as well as nursing notes, have been reviewed by the emergency provider.       ED MEDICATIONS:  Medications   aspirin tablet 325 mg (325 mg Oral Given 10/10/19 2120)   ondansetron injection 4 mg (4 mg Intravenous Given 10/10/19 2202)   ketorolac tablet 10 mg (10 mg Oral Given 10/10/19 2209)           ED EVENTS:    2205 HOURS  RE-EVALUATION:  The patient is resting comfortably and is in no acute distress. Discussed test results and notified of pending labs. Answered questions at this time.     2230 HOURS:  Patient care turned over to attending physician, Franco Parker MD.    12:43 AM - Counseling: Spoke with the patient and discussed todays findings, in addition to providing specific details for the plan of care and counseling regarding the diagnosis and prognosis. Questions are answered at this time.        EKG Readings: (Independently Interpreted)   Initial Reading: No STEMI. Previous EKG: Compared with most recent EKG Previous EKG Date: 07/18/2019 - no significant changes noted. Heart Rate: 49. Ectopy: No Ectopy. Conduction: Normal. ST Segments: Normal  ST Segments. T Waves: Normal. Axis: Normal. Clinical Impression: Sinus Bradycardia           Medical Decision Making:   History:   Old Records Summarized: records from clinic visits.  Clinical Tests:   Lab Tests: Reviewed and Ordered  Radiological Study: Ordered and Reviewed  Medical Tests: Ordered and Reviewed    Additional MDM:   Heart Score:    History:          Highly suspicious.  ECG:             Normal  Age:               45-65 years  Risk factors: >= 3 risk factors or history of atherosclerotic disease  Troponin:       >2x normal limit  Final Score: 7      MITCHELL Score:   Age over 65:                                    0.00   > or = to 3 CAD risk factors:           1.00  Established CAD:                            1.00  > or = to 2 anginal events in the past 24 hours: 0.00  Use of ASA in past 7 days:              1.00  Elevated Enzymes:                         1.00  ST Depression > or = to 0.05 mV:  0.00  MITCHELL score: 4    Heart Failure Score:   Systolic BP = 130-139  Heart Rate = <79  Sodium = 138  COPD = No  Black = Yes  BUN = 10-19  Age = 50-59  Patient has a GWTG HF Risk Score for In-Hospital Mortality of 27 which translates into a probability of death of <1% (Low Risk).                   Clinical Impression:       ICD-10-CM ICD-9-CM   1. Constipation, unspecified constipation type K59.00 564.00   2. Chest pain R07.9 786.50   3. Nausea R11.0 787.02   4. Lower abdominal pain R10.30 789.09   5. Sinus bradycardia R00.1 427.89           Disposition:   Disposition: Discharged  Condition: Stable                        Franco Parker MD  10/11/19 0043

## 2020-03-05 ENCOUNTER — HOSPITAL ENCOUNTER (OUTPATIENT)
Facility: HOSPITAL | Age: 55
Discharge: HOME | End: 2020-03-06
Attending: EMERGENCY MEDICINE | Admitting: EMERGENCY MEDICINE
Payer: COMMERCIAL

## 2020-03-05 DIAGNOSIS — R07.9 CHEST PAIN: ICD-10-CM

## 2020-03-05 DIAGNOSIS — I10 ESSENTIAL HYPERTENSION: ICD-10-CM

## 2020-03-05 DIAGNOSIS — R79.89 TROPONIN LEVEL ELEVATED: Primary | ICD-10-CM

## 2020-03-05 PROBLEM — E07.9 THYROID DISEASE: Status: ACTIVE | Noted: 2020-03-05

## 2020-03-05 LAB
ALBUMIN SERPL BCP-MCNC: 3.6 G/DL (ref 3.5–5.2)
ALP SERPL-CCNC: 66 U/L (ref 55–135)
ALT SERPL W/O P-5'-P-CCNC: 15 U/L (ref 10–44)
ANION GAP SERPL CALC-SCNC: 8 MMOL/L (ref 8–16)
APTT BLDCRRT: 28 SEC (ref 21–32)
APTT BLDCRRT: 67.4 SEC (ref 21–32)
AST SERPL-CCNC: 22 U/L (ref 10–40)
BASOPHILS # BLD AUTO: 0.03 K/UL (ref 0–0.2)
BASOPHILS NFR BLD: 0.4 % (ref 0–1.9)
BILIRUB SERPL-MCNC: 0.5 MG/DL (ref 0.1–1)
BILIRUB UR QL STRIP: NEGATIVE
BNP SERPL-MCNC: 62 PG/ML (ref 0–99)
BUN SERPL-MCNC: 14 MG/DL (ref 6–20)
CALCIUM SERPL-MCNC: 9.4 MG/DL (ref 8.7–10.5)
CHLORIDE SERPL-SCNC: 108 MMOL/L (ref 95–110)
CLARITY UR REFRACT.AUTO: CLEAR
CO2 SERPL-SCNC: 22 MMOL/L (ref 23–29)
COLOR UR AUTO: YELLOW
CREAT SERPL-MCNC: 0.9 MG/DL (ref 0.5–1.4)
DIFFERENTIAL METHOD: ABNORMAL
EOSINOPHIL # BLD AUTO: 0.2 K/UL (ref 0–0.5)
EOSINOPHIL NFR BLD: 2.1 % (ref 0–8)
ERYTHROCYTE [DISTWIDTH] IN BLOOD BY AUTOMATED COUNT: 13.4 % (ref 11.5–14.5)
EST. GFR  (AFRICAN AMERICAN): >60 ML/MIN/1.73 M^2
EST. GFR  (NON AFRICAN AMERICAN): >60 ML/MIN/1.73 M^2
GLUCOSE SERPL-MCNC: 98 MG/DL (ref 70–110)
GLUCOSE UR QL STRIP: NEGATIVE
HCT VFR BLD AUTO: 38.2 % (ref 37–48.5)
HGB BLD-MCNC: 12 G/DL (ref 12–16)
HGB UR QL STRIP: ABNORMAL
IMM GRANULOCYTES # BLD AUTO: 0.02 K/UL (ref 0–0.04)
IMM GRANULOCYTES NFR BLD AUTO: 0.2 % (ref 0–0.5)
INR PPP: 1 (ref 0.8–1.2)
KETONES UR QL STRIP: NEGATIVE
LEUKOCYTE ESTERASE UR QL STRIP: NEGATIVE
LYMPHOCYTES # BLD AUTO: 2.7 K/UL (ref 1–4.8)
LYMPHOCYTES NFR BLD: 33.7 % (ref 18–48)
MCH RBC QN AUTO: 26.4 PG (ref 27–31)
MCHC RBC AUTO-ENTMCNC: 31.4 G/DL (ref 32–36)
MCV RBC AUTO: 84 FL (ref 82–98)
MONOCYTES # BLD AUTO: 0.6 K/UL (ref 0.3–1)
MONOCYTES NFR BLD: 7.5 % (ref 4–15)
NEUTROPHILS # BLD AUTO: 4.6 K/UL (ref 1.8–7.7)
NEUTROPHILS NFR BLD: 56.1 % (ref 38–73)
NITRITE UR QL STRIP: NEGATIVE
NRBC BLD-RTO: 0 /100 WBC
PH UR STRIP: 8 [PH] (ref 5–8)
PLATELET # BLD AUTO: 256 K/UL (ref 150–350)
PMV BLD AUTO: 10.5 FL (ref 9.2–12.9)
POTASSIUM SERPL-SCNC: 4.7 MMOL/L (ref 3.5–5.1)
PROT SERPL-MCNC: 7.4 G/DL (ref 6–8.4)
PROT UR QL STRIP: NEGATIVE
PROTHROMBIN TIME: 10.7 SEC (ref 9–12.5)
RBC # BLD AUTO: 4.54 M/UL (ref 4–5.4)
SODIUM SERPL-SCNC: 138 MMOL/L (ref 136–145)
SP GR UR STRIP: 1.02 (ref 1–1.03)
TROPONIN I SERPL DL<=0.01 NG/ML-MCNC: 0.03 NG/ML (ref 0–0.03)
TROPONIN I SERPL DL<=0.01 NG/ML-MCNC: 0.14 NG/ML (ref 0–0.03)
TSH SERPL DL<=0.005 MIU/L-ACNC: 3.81 UIU/ML (ref 0.4–4)
URN SPEC COLLECT METH UR: ABNORMAL
UROBILINOGEN UR STRIP-ACNC: NEGATIVE EU/DL
WBC # BLD AUTO: 8.12 K/UL (ref 3.9–12.7)

## 2020-03-05 PROCEDURE — G0378 HOSPITAL OBSERVATION PER HR: HCPCS

## 2020-03-05 PROCEDURE — 93010 ELECTROCARDIOGRAM REPORT: CPT | Mod: ,,, | Performed by: INTERNAL MEDICINE

## 2020-03-05 PROCEDURE — 86803 HEPATITIS C AB TEST: CPT

## 2020-03-05 PROCEDURE — 85025 COMPLETE CBC W/AUTO DIFF WBC: CPT | Mod: ER

## 2020-03-05 PROCEDURE — 83880 ASSAY OF NATRIURETIC PEPTIDE: CPT | Mod: ER

## 2020-03-05 PROCEDURE — 80061 LIPID PANEL: CPT

## 2020-03-05 PROCEDURE — 93010 EKG 12-LEAD: ICD-10-PCS | Mod: ,,, | Performed by: INTERNAL MEDICINE

## 2020-03-05 PROCEDURE — 85730 THROMBOPLASTIN TIME PARTIAL: CPT | Mod: ER

## 2020-03-05 PROCEDURE — 99291 CRITICAL CARE FIRST HOUR: CPT | Mod: ER

## 2020-03-05 PROCEDURE — 81003 URINALYSIS AUTO W/O SCOPE: CPT | Mod: ER

## 2020-03-05 PROCEDURE — 25000003 PHARM REV CODE 250: Performed by: NURSE PRACTITIONER

## 2020-03-05 PROCEDURE — 93005 ELECTROCARDIOGRAM TRACING: CPT | Mod: ER

## 2020-03-05 PROCEDURE — 85730 THROMBOPLASTIN TIME PARTIAL: CPT | Mod: 91

## 2020-03-05 PROCEDURE — 36415 COLL VENOUS BLD VENIPUNCTURE: CPT

## 2020-03-05 PROCEDURE — 85610 PROTHROMBIN TIME: CPT | Mod: ER

## 2020-03-05 PROCEDURE — 84484 ASSAY OF TROPONIN QUANT: CPT | Mod: 91

## 2020-03-05 PROCEDURE — 96374 THER/PROPH/DIAG INJ IV PUSH: CPT | Performed by: EMERGENCY MEDICINE

## 2020-03-05 PROCEDURE — 63600175 PHARM REV CODE 636 W HCPCS: Mod: ER | Performed by: EMERGENCY MEDICINE

## 2020-03-05 PROCEDURE — 25500020 PHARM REV CODE 255: Mod: ER | Performed by: EMERGENCY MEDICINE

## 2020-03-05 PROCEDURE — 86703 HIV-1/HIV-2 1 RESULT ANTBDY: CPT

## 2020-03-05 PROCEDURE — 84443 ASSAY THYROID STIM HORMONE: CPT | Mod: ER

## 2020-03-05 PROCEDURE — 25000003 PHARM REV CODE 250: Mod: ER | Performed by: EMERGENCY MEDICINE

## 2020-03-05 PROCEDURE — 84484 ASSAY OF TROPONIN QUANT: CPT | Mod: ER

## 2020-03-05 PROCEDURE — G0378 HOSPITAL OBSERVATION PER HR: HCPCS | Mod: ER

## 2020-03-05 PROCEDURE — 80053 COMPREHEN METABOLIC PANEL: CPT | Mod: ER

## 2020-03-05 RX ORDER — POLYETHYLENE GLYCOL 3350 17 G/17G
17 POWDER, FOR SOLUTION ORAL DAILY
Status: DISCONTINUED | OUTPATIENT
Start: 2020-03-05 | End: 2020-03-06 | Stop reason: HOSPADM

## 2020-03-05 RX ORDER — ACETAMINOPHEN 500 MG
1000 TABLET ORAL
Status: COMPLETED | OUTPATIENT
Start: 2020-03-05 | End: 2020-03-05

## 2020-03-05 RX ORDER — ASPIRIN 325 MG
325 TABLET ORAL
Status: COMPLETED | OUTPATIENT
Start: 2020-03-05 | End: 2020-03-05

## 2020-03-05 RX ORDER — HEPARIN SODIUM,PORCINE/D5W 25000/250
12 INTRAVENOUS SOLUTION INTRAVENOUS CONTINUOUS
Status: DISCONTINUED | OUTPATIENT
Start: 2020-03-05 | End: 2020-03-06

## 2020-03-05 RX ORDER — SODIUM CHLORIDE 0.9 % (FLUSH) 0.9 %
10 SYRINGE (ML) INJECTION
Status: DISCONTINUED | OUTPATIENT
Start: 2020-03-05 | End: 2020-03-06 | Stop reason: HOSPADM

## 2020-03-05 RX ORDER — LISINOPRIL 10 MG/1
10 TABLET ORAL DAILY
Status: DISCONTINUED | OUTPATIENT
Start: 2020-03-06 | End: 2020-03-06 | Stop reason: HOSPADM

## 2020-03-05 RX ORDER — ACETAMINOPHEN 325 MG/1
650 TABLET ORAL EVERY 6 HOURS PRN
Status: DISCONTINUED | OUTPATIENT
Start: 2020-03-05 | End: 2020-03-06 | Stop reason: HOSPADM

## 2020-03-05 RX ORDER — ONDANSETRON 2 MG/ML
4 INJECTION INTRAMUSCULAR; INTRAVENOUS EVERY 6 HOURS PRN
Status: DISCONTINUED | OUTPATIENT
Start: 2020-03-05 | End: 2020-03-06 | Stop reason: HOSPADM

## 2020-03-05 RX ORDER — MORPHINE SULFATE 2 MG/ML
2 INJECTION, SOLUTION INTRAMUSCULAR; INTRAVENOUS EVERY 4 HOURS PRN
Status: DISCONTINUED | OUTPATIENT
Start: 2020-03-05 | End: 2020-03-06 | Stop reason: HOSPADM

## 2020-03-05 RX ORDER — ASPIRIN 325 MG
325 TABLET ORAL DAILY
Status: DISCONTINUED | OUTPATIENT
Start: 2020-03-06 | End: 2020-03-06 | Stop reason: HOSPADM

## 2020-03-05 RX ORDER — ATORVASTATIN CALCIUM 40 MG/1
40 TABLET, FILM COATED ORAL DAILY
Status: DISCONTINUED | OUTPATIENT
Start: 2020-03-06 | End: 2020-03-06 | Stop reason: HOSPADM

## 2020-03-05 RX ORDER — HYDRALAZINE HYDROCHLORIDE 20 MG/ML
10 INJECTION INTRAMUSCULAR; INTRAVENOUS EVERY 6 HOURS PRN
Status: DISCONTINUED | OUTPATIENT
Start: 2020-03-05 | End: 2020-03-06 | Stop reason: HOSPADM

## 2020-03-05 RX ORDER — CITALOPRAM 20 MG/1
20 TABLET, FILM COATED ORAL DAILY
Status: DISCONTINUED | OUTPATIENT
Start: 2020-03-06 | End: 2020-03-06 | Stop reason: HOSPADM

## 2020-03-05 RX ORDER — AMOXICILLIN 250 MG
1 CAPSULE ORAL DAILY PRN
Status: DISCONTINUED | OUTPATIENT
Start: 2020-03-05 | End: 2020-03-06 | Stop reason: HOSPADM

## 2020-03-05 RX ORDER — LEVOTHYROXINE SODIUM 25 UG/1
25 TABLET ORAL
Status: DISCONTINUED | OUTPATIENT
Start: 2020-03-06 | End: 2020-03-06 | Stop reason: HOSPADM

## 2020-03-05 RX ORDER — HYDROCODONE BITARTRATE AND ACETAMINOPHEN 10; 325 MG/1; MG/1
1 TABLET ORAL EVERY 6 HOURS PRN
Status: DISCONTINUED | OUTPATIENT
Start: 2020-03-05 | End: 2020-03-06 | Stop reason: HOSPADM

## 2020-03-05 RX ADMIN — HEPARIN SODIUM 12 UNITS/KG/HR: 10000 INJECTION, SOLUTION INTRAVENOUS at 10:03

## 2020-03-05 RX ADMIN — POLYETHYLENE GLYCOL (3350) 17 G: 17 POWDER, FOR SOLUTION ORAL at 10:03

## 2020-03-05 RX ADMIN — ACETAMINOPHEN 650 MG: 325 TABLET ORAL at 10:03

## 2020-03-05 RX ADMIN — IOHEXOL 100 ML: 350 INJECTION, SOLUTION INTRAVENOUS at 09:03

## 2020-03-05 RX ADMIN — ACETAMINOPHEN 1000 MG: 500 TABLET ORAL at 03:03

## 2020-03-05 RX ADMIN — NITROGLYCERIN 1 INCH: 20 OINTMENT TOPICAL at 08:03

## 2020-03-05 RX ADMIN — ASPIRIN 325 MG: 325 TABLET ORAL at 08:03

## 2020-03-05 NOTE — ED PROVIDER NOTES
Encounter Date: 3/5/2020       History     Chief Complaint   Patient presents with    Chest Pain     right arm pain and weakness to left leg.     54-year-old female with past medical history of hypertension, hyperlipidemia, previous MI, previous CVA presents to the emergency department with complaints of 1 hr of anterior central chest pain that radiates down her right arm is moderate severity, and constant.  Patient is reporting paresthesias in her right hand and a feeling of heaviness in her left leg as well. Patient denies any nausea, vomiting, diarrhea, fevers, chills, shortness of breath, cough, cold, congestion.        Review of patient's allergies indicates:  No Known Allergies  Past Medical History:   Diagnosis Date    Coronary atherosclerosis 2019    Depression     GERD (gastroesophageal reflux disease)     High cholesterol     Hyperlipidemia     Hypertension     Hypothyroidism     Metabolic syndrome X 2019    MI (myocardial infarction)     Stroke     Vitamin D deficiency 2019     Past Surgical History:   Procedure Laterality Date    ANGIOPLASTY       SECTION      x 2    HYSTERECTOMY      OOPHORECTOMY       No family history on file.  Social History     Tobacco Use    Smoking status: Never Smoker    Smokeless tobacco: Never Used   Substance Use Topics    Alcohol use: Yes     Comment: occasionally    Drug use: No     Review of Systems   Constitutional: Negative for diaphoresis and fever.   HENT: Negative for congestion, dental problem and sore throat.    Eyes: Negative for pain and visual disturbance.   Respiratory: Negative for cough and shortness of breath.    Cardiovascular: Positive for chest pain. Negative for palpitations.   Gastrointestinal: Negative for abdominal pain, diarrhea, nausea and vomiting.   Genitourinary: Negative for dysuria and flank pain.   Musculoskeletal: Negative for back pain and neck pain.   Skin: Negative for rash and wound.    Neurological: Positive for weakness and numbness. Negative for headaches.   Psychiatric/Behavioral: Negative for agitation and confusion.       Physical Exam     Initial Vitals [03/05/20 0800]   BP Pulse Resp Temp SpO2   (!) 156/89 (!) 45 20 -- 98 %      MAP       --         Physical Exam    Constitutional: She appears well-developed and well-nourished.   HENT:   Head: Normocephalic and atraumatic.   Eyes: EOM are normal. Pupils are equal, round, and reactive to light.   Neck: Normal range of motion. Neck supple.   Cardiovascular: Regular rhythm.   Bradycardia   Pulmonary/Chest: Breath sounds normal. No respiratory distress.   Abdominal: She exhibits no distension. There is no tenderness.   Musculoskeletal: Normal range of motion. She exhibits no tenderness.   Neurological: She is alert and oriented to person, place, and time. She has normal strength. No cranial nerve deficit or sensory deficit. GCS score is 15. GCS eye subscore is 4. GCS verbal subscore is 5. GCS motor subscore is 6.   NIH = 0. Patient has no drift in the BLE, or BUE's. There is no ataxia or dysmetria on finger nose finger, or heel shin exam. Speech is clear, and fluent.    Skin: Skin is warm and dry.   Psychiatric: She has a normal mood and affect.         ED Course   Critical Care  Date/Time: 3/5/2020 10:12 AM  Performed by: Lloyd Melendez MD  Authorized by: Lloyd Melendez MD   Total critical care time (exclusive of procedural time) : 36 minutes  Critical care time was exclusive of separately billable procedures and treating other patients and teaching time.  Critical care was necessary to treat or prevent imminent or life-threatening deterioration of the following conditions: cardiac failure.  Critical care was time spent personally by me on the following activities: blood draw for specimens, evaluation of patient's response to treatment, obtaining history from patient or surrogate, ordering and review of laboratory studies, pulse oximetry,  development of treatment plan with patient or surrogate, interpretation of cardiac output measurements, examination of patient, ordering and performing treatments and interventions, ordering and review of radiographic studies and re-evaluation of patient's condition.        Labs Reviewed   CBC W/ AUTO DIFFERENTIAL - Abnormal; Notable for the following components:       Result Value    Mean Corpuscular Hemoglobin 26.4 (*)     Mean Corpuscular Hemoglobin Conc 31.4 (*)     All other components within normal limits   COMPREHENSIVE METABOLIC PANEL - Abnormal; Notable for the following components:    CO2 22 (*)     All other components within normal limits   URINALYSIS, REFLEX TO URINE CULTURE - Abnormal; Notable for the following components:    Occult Blood UA Trace (*)     All other components within normal limits    Narrative:     Preferred Collection Type->Urine, Clean Catch   TROPONIN I - Abnormal; Notable for the following components:    Troponin I 0.138 (*)     All other components within normal limits   B-TYPE NATRIURETIC PEPTIDE   APTT   PROTIME-INR   TSH   HIV 1 / 2 ANTIBODY   HEPATITIS C ANTIBODY     EKG Readings: (Independently Interpreted)   Rate of 50 beats per minute.  Normal axis.  Sinus bradycardia.  P.r., QRS and QTC are within normal limits.  No STEMI.  No changes from 10/10/2019.       Imaging Results          CTA Chest Abdomen Non Coronary (Final result)  Result time 03/05/20 10:03:40    Final result by Pierre Tomas MD (03/05/20 10:03:40)                 Impression:      Cardiomegaly.  No evidence of aortic dissection.    Moderate constipation.    Mild atelectasis or infiltrate left lower lobe.    All CT scans at this facility use dose modulation, iterative reconstruction and/or weight based dosing when appropriate to reduce radiation dose to as low as reasonably achievable.      Electronically signed by: Pierre Tomas MD  Date:    03/05/2020  Time:    10:03             Narrative:     EXAMINATION:  CTA CHEST ABDOMEN NON CORONARY (XPD)    CLINICAL HISTORY:  Aortic dz, non-traumatic, known or suspect;    TECHNIQUE:  The patient was surveyed from the thoracic inlet through the upper pelvis after the administration of 100 cc Omni 350 IV contrast using CTA angiography technique and data was reconstructed for coronal, sagittal, and axial images.    COMPARISON:  10/10/2019    FINDINGS:  The soft tissues at the base of the neck are unremarkable.  The thyroid gland is normal in size and configuration.  There is no abnormal lymph node enlargement.    There is no axillary, mediastinal, or hilar lymphadenopathy.  Shotty axillary adenopathy is noted.  The hilar contours are unremarkable.  The esophagus is normal in course and contour.    The thoracic aorta is normal in course, caliber, and contour without significant atherosclerotic calcifications.Tortuosity of the descending aorta can be seen with chronic hypertension.    Cardiomegaly is noted.  No significant coronary calcifications.  No pericardial effusion.    The trachea and proximal airways are patent.    Decreased lung volumes are noted.  Bilateral dependent changes.  Mild atelectasis or infiltrate left lower lobe.    The liver is normal in size and attenuation with no focal hepatic abnormality.  The gallbladder shows no evidence of stones or pericholecystic fluid.  There is no intra-or extrahepatic biliary ductal dilatation.    The stomach, spleen, pancreas, and adrenal glands are unremarkable.    The kidneys are normal in size and location.  Small benign cyst right lower pole.  There is no evidence of hydronephrosis.  The ureters appear normal in course and caliber without evidence of ureteral dilatation. The urinary bladder demonstrates no significant abnormality.    The abdominal aorta is normal in course and caliber without significant atherosclerotic calcifications.  Mild narrowing at the origin of the right renal artery.  Mesenteric arteries are  mildly patent.    Small hiatal hernia the visualized loops of small bowel show no evidence of obstruction or inflammation. Large bowel is unremarkable with moderate constipation.  There is no ascites, free fluid, or intraperitoneal free air. There is no evidence of lymph node enlargement in the abdomen or pelvis.    Small fat containing umbilical hernia.    When viewed with bone windows the osseous structures are unremarkable.  The extraperitoneal soft tissues are unremarkable.                               CT Head Without Contrast (Final result)  Result time 03/05/20 08:25:40    Final result by Pierre Tomas MD (03/05/20 08:25:40)                 Impression:      Mild chronic microvascular ischemic changes.    All CT scans at this facility use dose modulation, iterative reconstruction, and/or weight based dosing when appropriate to reduce radiation dose to as low as reasonable achievable.      Electronically signed by: Pierre Tomas MD  Date:    03/05/2020  Time:    08:25             Narrative:    EXAMINATION:  CT HEAD WITHOUT CONTRAST    CLINICAL HISTORY:  Focal neuro deficit, new, fixed or worsening, <6 hours;    TECHNIQUE:  Low dose axial CT images obtained throughout the head without intravenous contrast. Sagittal and coronal reconstructions were performed.    COMPARISON:  None.    FINDINGS:  Intracranial compartment:    The brain parenchyma demonstrates areas of decreased attenuation with mild periventricular white matter consistent with chronic microvascular ischemic changes.  No parenchymal mass, hemorrhage, edema or major vascular distribution infarct.  Vascular calcifications are noted.    Mild prominence of the sulci and ventricles are consistent with age-related involutional changes.    No extra-axial blood or fluid collections.    Skull/extracranial contents (limited evaluation): No fracture. Mastoid air cells and paranasal sinuses are essentially clear.                               X-Ray Chest AP  Portable (Final result)  Result time 03/05/20 08:20:26    Final result by Gomez Rodriguez MD (03/05/20 08:20:26)                 Impression:      No acute abnormality.      Electronically signed by: Gomez Rodriguez  Date:    03/05/2020  Time:    08:20             Narrative:    EXAMINATION:  XR CHEST AP PORTABLE    CLINICAL HISTORY:  Chest pain;.    TECHNIQUE:  Single frontal portable view of the chest was performed.    COMPARISON:  10/10/2019    FINDINGS:  Support devices: None    Unchanged elevation of the left hemidiaphragm.  Posterior lung bases are not well visualized due to AP technique.  Pulmonary vascular congestion without rupinder pulmonary edema.  No pleural effusion or pneumothorax.    Unchanged mild cardiomegaly..  The hilar and mediastinal contours are unremarkable.    Bones are intact.                                                   ED Course as of Mar 05 1013   Thu Mar 05, 2020   1006 All historical, clinical, radiographic, and laboratory findings were reviewed with the patient/family in detail along with the indications for transport to the facility in [place] in order to receive cardiology and hospitalist consultation and observation.  All remaining questions and concerns were addressed at this time and the patient/family communicates understanding and agrees to proceed accordingly.  Similarly, all pertinent details of the encounter were discussed with Dr. Sage who agrees to receive the patient at [facility] for further care as outlined above.  The [facility] secondary to a need for ongoing IVF en route.  Lloyd Melendez MD  10:06 AM          [BA]   1006 Spoke with Dr. Saab with cardiology, recommends heparin gtt.     [BA]      ED Course User Index  [BA] Lloyd Melendez MD                Clinical Impression:       ICD-10-CM ICD-9-CM   1. Troponin level elevated R79.89 790.6   2. Chest pain R07.9 786.50         Disposition:   Condition:  Stable     ED Disposition Condition    Observation                           Lloyd Melendez MD  03/05/20 1008       Lloyd Melendez MD  03/05/20 101

## 2020-03-05 NOTE — ED NOTES
Pt complains of chest pain radiating to right arm. Reports symptoms started as right sided neck pain last night, was sitting on the side of the bed this morning when noted sudden onset of substernal chest pain radiating to the right arm. Symptoms are modified by nothing. Pt describes pain as sharp. Previous treatments includes nothing. Pt denies nausea, shortness of breath, diaphoresis.    Level of Consciousness: The patient is awake, alert, and oriented to person, place, time, and event. Pts affect is appropriate, speech and interaction are appropriate.   Appearance: Pt is resting comfortably on stretcher, no acute distress is noted. Clothing and hygiene are appropriate.   Skin: Skin is grossly intact, PWD, with normal skin turgor. Mucous membranes are moist. Skin color normal.   Musculoskeletal: Moves all extremities well, full range of motion, no obvious deformities noted. Pt ambulates independently and without abnormality.   Respiratory: Airway open and patent. Respiration rate even and unlabored. No use of accessory muscles noted. Breath sounds clear to ausculation.   Cardiac: Regular rate and rhythm. No peripheral edema noted. Peripheral pulses palpated. Capillary refill brisk.   HEENT: atraumatic, normocephalic  Abdomen: No distension noted.   Neurologic: Symmetrical expression noted in face. Hand grasps equal. Normal sensation reported in all extremities. No obvious neurological deficits noted.   Psychosocial: No family/friend at bedside.     Pt made aware of plan of care, verbalizes understanding and denies any questions at this time. Bed low and locked, side rails up x 2. Call light in reach. Cardiac monitor applied, alarms on. Will continue to monitor patient.

## 2020-03-06 VITALS
TEMPERATURE: 98 F | HEIGHT: 63 IN | RESPIRATION RATE: 12 BRPM | SYSTOLIC BLOOD PRESSURE: 135 MMHG | OXYGEN SATURATION: 97 % | DIASTOLIC BLOOD PRESSURE: 62 MMHG | BODY MASS INDEX: 35.43 KG/M2 | HEART RATE: 52 BPM | WEIGHT: 199.94 LBS

## 2020-03-06 LAB
ALBUMIN SERPL BCP-MCNC: 3.5 G/DL (ref 3.5–5.2)
ALP SERPL-CCNC: 68 U/L (ref 55–135)
ALT SERPL W/O P-5'-P-CCNC: 12 U/L (ref 10–44)
ANION GAP SERPL CALC-SCNC: 9 MMOL/L (ref 8–16)
AORTIC ROOT ANNULUS: 3.4 CM
APTT BLDCRRT: 55.2 SEC (ref 21–32)
APTT BLDCRRT: 58.7 SEC (ref 21–32)
ASCENDING AORTA: 2.74 CM
AST SERPL-CCNC: 15 U/L (ref 10–40)
AV INDEX (PROSTH): 0.89
AV MEAN GRADIENT: 4 MMHG
AV PEAK GRADIENT: 8 MMHG
AV VALVE AREA: 3.14 CM2
AV VELOCITY RATIO: 0.84
BASOPHILS # BLD AUTO: 0.03 K/UL (ref 0–0.2)
BASOPHILS NFR BLD: 0.4 % (ref 0–1.9)
BILIRUB SERPL-MCNC: 0.5 MG/DL (ref 0.1–1)
BSA FOR ECHO PROCEDURE: 1.98 M2
BUN SERPL-MCNC: 12 MG/DL (ref 6–20)
CALCIUM SERPL-MCNC: 9.4 MG/DL (ref 8.7–10.5)
CHLORIDE SERPL-SCNC: 104 MMOL/L (ref 95–110)
CHOLEST SERPL-MCNC: 157 MG/DL (ref 120–199)
CHOLEST/HDLC SERPL: 3.7 {RATIO} (ref 2–5)
CO2 SERPL-SCNC: 26 MMOL/L (ref 23–29)
CREAT SERPL-MCNC: 0.8 MG/DL (ref 0.5–1.4)
CV ECHO LV RWT: 0.62 CM
CV STRESS BASE HR: 42 BPM
DIASTOLIC BLOOD PRESSURE: 69 MMHG
DIFFERENTIAL METHOD: ABNORMAL
DOP CALC AO PEAK VEL: 1.4 M/S
DOP CALC AO VTI: 37.43 CM
DOP CALC LVOT AREA: 3.5 CM2
DOP CALC LVOT DIAMETER: 2.12 CM
DOP CALC LVOT PEAK VEL: 1.17 M/S
DOP CALC LVOT STROKE VOLUME: 117.66 CM3
DOP CALCLVOT PEAK VEL VTI: 33.35 CM
E WAVE DECELERATION TIME: 304.23 MSEC
E/A RATIO: 0.95
E/E' RATIO: 7.89 M/S
ECHO LV POSTERIOR WALL: 1.24 CM (ref 0.6–1.1)
EOSINOPHIL # BLD AUTO: 0.1 K/UL (ref 0–0.5)
EOSINOPHIL NFR BLD: 1.7 % (ref 0–8)
ERYTHROCYTE [DISTWIDTH] IN BLOOD BY AUTOMATED COUNT: 13.4 % (ref 11.5–14.5)
EST. GFR  (AFRICAN AMERICAN): >60 ML/MIN/1.73 M^2
EST. GFR  (NON AFRICAN AMERICAN): >60 ML/MIN/1.73 M^2
FRACTIONAL SHORTENING: 29 % (ref 28–44)
GLUCOSE SERPL-MCNC: 91 MG/DL (ref 70–110)
HCT VFR BLD AUTO: 37 % (ref 37–48.5)
HCV AB SERPL QL IA: NEGATIVE
HDLC SERPL-MCNC: 43 MG/DL (ref 40–75)
HDLC SERPL: 27.4 % (ref 20–50)
HGB BLD-MCNC: 11.6 G/DL (ref 12–16)
HIV 1+2 AB+HIV1 P24 AG SERPL QL IA: NEGATIVE
IMM GRANULOCYTES # BLD AUTO: 0.02 K/UL (ref 0–0.04)
IMM GRANULOCYTES NFR BLD AUTO: 0.2 % (ref 0–0.5)
INTERVENTRICULAR SEPTUM: 1.07 CM (ref 0.6–1.1)
IVRT: 105.61 MSEC
LA MAJOR: 4.75 CM
LA MINOR: 2.85 CM
LA WIDTH: 4.01 CM
LDLC SERPL CALC-MCNC: 102.8 MG/DL (ref 63–159)
LEFT ATRIUM SIZE: 2.99 CM
LEFT ATRIUM VOLUME INDEX: 19 ML/M2
LEFT ATRIUM VOLUME: 36.31 CM3
LEFT INTERNAL DIMENSION IN SYSTOLE: 2.88 CM (ref 2.1–4)
LEFT VENTRICLE DIASTOLIC VOLUME INDEX: 37.14 ML/M2
LEFT VENTRICLE DIASTOLIC VOLUME: 71.05 ML
LEFT VENTRICLE MASS INDEX: 83 G/M2
LEFT VENTRICLE SYSTOLIC VOLUME INDEX: 16.5 ML/M2
LEFT VENTRICLE SYSTOLIC VOLUME: 31.65 ML
LEFT VENTRICULAR INTERNAL DIMENSION IN DIASTOLE: 4.03 CM (ref 3.5–6)
LEFT VENTRICULAR MASS: 158.17 G
LV LATERAL E/E' RATIO: 8.33 M/S
LV SEPTAL E/E' RATIO: 7.5 M/S
LYMPHOCYTES # BLD AUTO: 2.6 K/UL (ref 1–4.8)
LYMPHOCYTES NFR BLD: 32.4 % (ref 18–48)
MCH RBC QN AUTO: 26.2 PG (ref 27–31)
MCHC RBC AUTO-ENTMCNC: 31.4 G/DL (ref 32–36)
MCV RBC AUTO: 84 FL (ref 82–98)
MONOCYTES # BLD AUTO: 0.5 K/UL (ref 0.3–1)
MONOCYTES NFR BLD: 5.9 % (ref 4–15)
MV PEAK A VEL: 0.79 M/S
MV PEAK E VEL: 0.75 M/S
NEUTROPHILS # BLD AUTO: 4.8 K/UL (ref 1.8–7.7)
NEUTROPHILS NFR BLD: 59.4 % (ref 38–73)
NONHDLC SERPL-MCNC: 114 MG/DL
NRBC BLD-RTO: 0 /100 WBC
NUC REST DIASTOLIC VOLUME INDEX: 88
NUC REST EJECTION FRACTION: 65
NUC REST SYSTOLIC VOLUME INDEX: 31
NUC STRESS DIASTOLIC VOLUME INDEX: 83
NUC STRESS EJECTION FRACTION: 64 %
NUC STRESS SYSTOLIC VOLUME INDEX: 30
OHS CV CPX 85 PERCENT MAX PREDICTED HEART RATE MALE: 134
OHS CV CPX MAX PREDICTED HEART RATE: 158
OHS CV CPX PATIENT IS FEMALE: 1
OHS CV CPX PATIENT IS MALE: 0
OHS CV CPX PEAK DIASTOLIC BLOOD PRESSURE: 69 MMHG
OHS CV CPX PEAK HEAR RATE: 72 BPM
OHS CV CPX PEAK RATE PRESSURE PRODUCT: 9792
OHS CV CPX PEAK SYSTOLIC BLOOD PRESSURE: 136 MMHG
OHS CV CPX PERCENT MAX PREDICTED HEART RATE ACHIEVED: 46
OHS CV CPX RATE PRESSURE PRODUCT PRESENTING: 5712
PISA TR MAX VEL: 2.8 M/S
PLATELET # BLD AUTO: 251 K/UL (ref 150–350)
PMV BLD AUTO: 10.3 FL (ref 9.2–12.9)
POTASSIUM SERPL-SCNC: 4.1 MMOL/L (ref 3.5–5.1)
PROT SERPL-MCNC: 6.8 G/DL (ref 6–8.4)
PULM VEIN S/D RATIO: 1.21
PV PEAK D VEL: 0.48 M/S
PV PEAK S VEL: 0.58 M/S
RA MAJOR: 4.31 CM
RA PRESSURE: 3 MMHG
RBC # BLD AUTO: 4.42 M/UL (ref 4–5.4)
SINUS: 3.1 CM
SODIUM SERPL-SCNC: 139 MMOL/L (ref 136–145)
STJ: 2.96 CM
STRESS ECHO TARGET HR: 140 BPM
SYSTOLIC BLOOD PRESSURE: 136 MMHG
TDI LATERAL: 0.09 M/S
TDI SEPTAL: 0.1 M/S
TDI: 0.1 M/S
TR MAX PG: 31 MMHG
TRICUSPID ANNULAR PLANE SYSTOLIC EXCURSION: 2.62 CM
TRIGL SERPL-MCNC: 56 MG/DL (ref 30–150)
TROPONIN I SERPL DL<=0.01 NG/ML-MCNC: 0.03 NG/ML (ref 0–0.03)
TROPONIN I SERPL DL<=0.01 NG/ML-MCNC: 0.04 NG/ML (ref 0–0.03)
TV REST PULMONARY ARTERY PRESSURE: 34 MMHG
WBC # BLD AUTO: 8.15 K/UL (ref 3.9–12.7)

## 2020-03-06 PROCEDURE — 85730 THROMBOPLASTIN TIME PARTIAL: CPT

## 2020-03-06 PROCEDURE — 85025 COMPLETE CBC W/AUTO DIFF WBC: CPT

## 2020-03-06 PROCEDURE — 84484 ASSAY OF TROPONIN QUANT: CPT | Mod: 91

## 2020-03-06 PROCEDURE — 99204 PR OFFICE/OUTPT VISIT, NEW, LEVL IV, 45-59 MIN: ICD-10-PCS | Mod: 25,,, | Performed by: INTERNAL MEDICINE

## 2020-03-06 PROCEDURE — 25000003 PHARM REV CODE 250: Performed by: NURSE PRACTITIONER

## 2020-03-06 PROCEDURE — G0378 HOSPITAL OBSERVATION PER HR: HCPCS

## 2020-03-06 PROCEDURE — 63600175 PHARM REV CODE 636 W HCPCS: Performed by: NURSE PRACTITIONER

## 2020-03-06 PROCEDURE — 36415 COLL VENOUS BLD VENIPUNCTURE: CPT

## 2020-03-06 PROCEDURE — 85730 THROMBOPLASTIN TIME PARTIAL: CPT | Mod: 91

## 2020-03-06 PROCEDURE — 99204 OFFICE O/P NEW MOD 45 MIN: CPT | Mod: 25,,, | Performed by: INTERNAL MEDICINE

## 2020-03-06 PROCEDURE — 80053 COMPREHEN METABOLIC PANEL: CPT

## 2020-03-06 RX ORDER — NITROGLYCERIN 0.4 MG/1
0.4 TABLET SUBLINGUAL EVERY 5 MIN PRN
Status: DISCONTINUED | OUTPATIENT
Start: 2020-03-06 | End: 2020-03-06 | Stop reason: HOSPADM

## 2020-03-06 RX ORDER — REGADENOSON 0.08 MG/ML
0.4 INJECTION, SOLUTION INTRAVENOUS ONCE
Status: COMPLETED | OUTPATIENT
Start: 2020-03-06 | End: 2020-03-06

## 2020-03-06 RX ADMIN — CITALOPRAM HYDROBROMIDE 20 MG: 20 TABLET ORAL at 08:03

## 2020-03-06 RX ADMIN — ASPIRIN 325 MG ORAL TABLET 325 MG: 325 PILL ORAL at 08:03

## 2020-03-06 RX ADMIN — POLYETHYLENE GLYCOL (3350) 17 G: 17 POWDER, FOR SOLUTION ORAL at 08:03

## 2020-03-06 RX ADMIN — LEVOTHYROXINE SODIUM 25 MCG: 25 TABLET ORAL at 07:03

## 2020-03-06 RX ADMIN — LISINOPRIL 10 MG: 10 TABLET ORAL at 08:03

## 2020-03-06 RX ADMIN — REGADENOSON 0.4 MG: 0.08 INJECTION, SOLUTION INTRAVENOUS at 11:03

## 2020-03-06 RX ADMIN — ATORVASTATIN CALCIUM 40 MG: 40 TABLET, FILM COATED ORAL at 08:03

## 2020-03-06 NOTE — DISCHARGE SUMMARY
[facility] Hospital Medicine  Discharge Summary      Patient Name: Scooter Ochoa  MRN: [id number]  Admission Date: 3/5/2020  Hospital Length of Stay: 0 days  Discharge Date and Time:  03/06/2020 5:01 PM  Attending Physician: Ruben Marie, *   Discharging Provider: RAVI Henning  Primary Care Provider: Rosita Mcgrath MD      HPI:   Scooter Ochoa is a 55 yo female with a PMH of hypertension, hyperlipidemia, previous MI and previous CVA who presented to [facility] ER with complaints of substernal chest pain that radiates to her right arm and neck. Associated symptoms include shortness of breath and headache. The patient denies any modifying factors. Patient denies fever, chills, cough, pnd, orthopnea, palpitations, diaphoresis, blurred vision, numbness, tingling, dizziness, localized weakness, n/v/d, abdominal pain, blood in stools, melena, hematemesis, urinary frequency, urgency, dysuria or hematuria. In the ER the patient was found to have an elevated troponin of 0.138. The case was discussed with Cardiology who recommended a heparin gtt and trending troponins. The patient is being placed in observation to rule out ACS.         * No surgery found *      Hospital Course:   Scooter Ochoa is a 54 year old female placed on observation for chest pain. Serial troponin 0.138>>0.034>>0.034. Heparin gtt initiated. Cardiology consulted and recommended stress test. Stress test free of evidence from myocardial ischemia or injury. Discussed with Dr. Saab (cardiology) okay to discharge from cardiology standpoint. Recommended BP control. Chest pain has resolved. Pt primary cardiologist was Dr. Jordon Clemente with [facility] but has retired. Pt will follow up with Dr. Yoli Bolaños at [facility] on 02/25/20 at 2:00 PM for hospital follow up. This patient was seen and examined on the date of discharge and determined suitable for discharge.      Consults:   Consults (From admission, onward)        Status  Ordering Provider     Inpatient consult to Cardiology  Once     Provider:  MD Joe Herrera BRANDON          Final Active Diagnoses:    Diagnosis Date Noted POA    PRINCIPAL PROBLEM:  Troponin level elevated [R79.89] 03/05/2020 Yes    Thyroid disease [E07.9] 03/05/2020 Yes    Recurrent major depression [F33.9] 05/24/2019 Yes    HTN (hypertension) [I10] 01/21/2015 Yes      Problems Resolved During this Admission:       Discharged Condition: stable    Disposition: Home or Self Care    Follow Up:  Follow-up Information     Rosita Mcgrath MD. Schedule an appointment as soon as possible for a visit in 3 days.    Specialty:  Internal Medicine  Why:  hospital follow up  Contact information:  [address] [address]  [phone]             Yoli Bolaños MD. Go on 3/25/2020.    Why:  Appt scheduled at 2:00 PM  Contact information:  [facility] [place] [address]  [address] [phone]           Kayden Barnhart MD. Schedule an appointment as soon as possible for a visit in 2 weeks.    Specialty:  Pulmonary Disease  Why:  workup for sleep apnea  Contact information:  [address]  [place] [address]  [phone]                 Patient Instructions:      Ambulatory referral/consult to Pulmonology   Standing Status: Future   Referral Priority: Routine Referral Type: Consultation   Referral Reason: Specialty Services Required   Referred to Provider: KAYDEN BARNHART Requested Specialty: Pulmonary Disease   Number of Visits Requested: 1     Notify your health care provider if you experience any of the following:  increased confusion or weakness     Notify your health care provider if you experience any of the following:  persistent dizziness, light-headedness, or visual disturbances     Notify your health care provider if you experience any of the following:  difficulty breathing or increased cough     Activity as  tolerated       Significant Diagnostic Studies: Labs:   CMP   Recent Labs   Lab 03/05/20  0806 03/06/20  0716    139   K 4.7 4.1    104   CO2 22* 26   GLU 98 91   BUN 14 12   CREATININE 0.9 0.8   CALCIUM 9.4 9.4   PROT 7.4 6.8   ALBUMIN 3.6 3.5   BILITOT 0.5 0.5   ALKPHOS 66 68   AST 22 15   ALT 15 12   ANIONGAP 8 9   ESTGFRAFRICA >60.0 >60   EGFRNONAA >60.0 >60   , CBC   Recent Labs   Lab 03/05/20  0806 03/06/20  0716   WBC 8.12 8.15   HGB 12.0 11.6*   HCT 38.2 37.0    251    and Troponin   Recent Labs   Lab 03/06/20  0716   TROPONINI 0.035*     Radiology:   Imaging Results          CTA Chest Abdomen Non Coronary (Final result)  Result time 03/05/20 10:03:40    Final result by Pierre Tomas MD (03/05/20 10:03:40)                 Impression:      Cardiomegaly.  No evidence of aortic dissection.    Moderate constipation.    Mild atelectasis or infiltrate left lower lobe.    All CT scans at this facility use dose modulation, iterative reconstruction and/or weight based dosing when appropriate to reduce radiation dose to as low as reasonably achievable.      Electronically signed by: Pierre Tomas MD  Date:    03/05/2020  Time:    10:03             Narrative:    EXAMINATION:  CTA CHEST ABDOMEN NON CORONARY (XPD)    CLINICAL HISTORY:  Aortic dz, non-traumatic, known or suspect;    TECHNIQUE:  The patient was surveyed from the thoracic inlet through the upper pelvis after the administration of 100 cc Omni 350 IV contrast using CTA angiography technique and data was reconstructed for coronal, sagittal, and axial images.    COMPARISON:  10/10/2019    FINDINGS:  The soft tissues at the base of the neck are unremarkable.  The thyroid gland is normal in size and configuration.  There is no abnormal lymph node enlargement.    There is no axillary, mediastinal, or hilar lymphadenopathy.  Shotty axillary adenopathy is noted.  The hilar contours are unremarkable.  The esophagus is normal in course and  contour.    The thoracic aorta is normal in course, caliber, and contour without significant atherosclerotic calcifications.Tortuosity of the descending aorta can be seen with chronic hypertension.    Cardiomegaly is noted.  No significant coronary calcifications.  No pericardial effusion.    The trachea and proximal airways are patent.    Decreased lung volumes are noted.  Bilateral dependent changes.  Mild atelectasis or infiltrate left lower lobe.    The liver is normal in size and attenuation with no focal hepatic abnormality.  The gallbladder shows no evidence of stones or pericholecystic fluid.  There is no intra-or extrahepatic biliary ductal dilatation.    The stomach, spleen, pancreas, and adrenal glands are unremarkable.    The kidneys are normal in size and location.  Small benign cyst right lower pole.  There is no evidence of hydronephrosis.  The ureters appear normal in course and caliber without evidence of ureteral dilatation. The urinary bladder demonstrates no significant abnormality.    The abdominal aorta is normal in course and caliber without significant atherosclerotic calcifications.  Mild narrowing at the origin of the right renal artery.  Mesenteric arteries are mildly patent.    Small hiatal hernia the visualized loops of small bowel show no evidence of obstruction or inflammation. Large bowel is unremarkable with moderate constipation.  There is no ascites, free fluid, or intraperitoneal free air. There is no evidence of lymph node enlargement in the abdomen or pelvis.    Small fat containing umbilical hernia.    When viewed with bone windows the osseous structures are unremarkable.  The extraperitoneal soft tissues are unremarkable.                               CT Head Without Contrast (Final result)  Result time 03/05/20 08:25:40    Final result by Pierre Tomas MD (03/05/20 08:25:40)                 Impression:      Mild chronic microvascular ischemic changes.    All CT scans at  this facility use dose modulation, iterative reconstruction, and/or weight based dosing when appropriate to reduce radiation dose to as low as reasonable achievable.      Electronically signed by: Pierre Tomas MD  Date:    03/05/2020  Time:    08:25             Narrative:    EXAMINATION:  CT HEAD WITHOUT CONTRAST    CLINICAL HISTORY:  Focal neuro deficit, new, fixed or worsening, <6 hours;    TECHNIQUE:  Low dose axial CT images obtained throughout the head without intravenous contrast. Sagittal and coronal reconstructions were performed.    COMPARISON:  None.    FINDINGS:  Intracranial compartment:    The brain parenchyma demonstrates areas of decreased attenuation with mild periventricular white matter consistent with chronic microvascular ischemic changes.  No parenchymal mass, hemorrhage, edema or major vascular distribution infarct.  Vascular calcifications are noted.    Mild prominence of the sulci and ventricles are consistent with age-related involutional changes.    No extra-axial blood or fluid collections.    Skull/extracranial contents (limited evaluation): No fracture. Mastoid air cells and paranasal sinuses are essentially clear.                               X-Ray Chest AP Portable (Final result)  Result time 03/05/20 08:20:26    Final result by Gomez Rodriguez MD (03/05/20 08:20:26)                 Impression:      No acute abnormality.      Electronically signed by: Gomez Rodriguez  Date:    03/05/2020  Time:    08:20             Narrative:    EXAMINATION:  XR CHEST AP PORTABLE    CLINICAL HISTORY:  Chest pain;.    TECHNIQUE:  Single frontal portable view of the chest was performed.    COMPARISON:  10/10/2019    FINDINGS:  Support devices: None    Unchanged elevation of the left hemidiaphragm.  Posterior lung bases are not well visualized due to AP technique.  Pulmonary vascular congestion without rupinder pulmonary edema.  No pleural effusion or pneumothorax.    Unchanged mild cardiomegaly..  The hilar  and mediastinal contours are unremarkable.    Bones are intact.                              Cardiac Graphics: Echocardiogram:   2D echo with color flow doppler:   Results for orders placed or performed during the hospital encounter of 01/21/15   2D echo with color flow doppler   Result Value Ref Range    QEF 60 55 - 65    Diastolic Dysfunction No     Est. PA Systolic Pressure 26.04     Narrative    TEST DESCRIPTION   Technical Quality: This is a portable study performed at the patient's bedside. This is a technically adequate study.     Aorta: The aortic root is normal in size, measuring 2.3 cm at sinotubular junction.     Left Atrium: The left atrial volume index is moderately enlarged, measuring 35.09 cc/m2.     Left Ventricle: The left ventricle is normal in size, with an end-diastolic diameter of 3.9 cm, and an end-systolic diameter of 2.1 cm. LV wall thickness is normal, with the septum and the posterior wall each measuring 1.0 cm across. Relative wall   thickness was increased at 0.51, and the LV mass index was 74.8 g/m2 consistent with concentric remodeling. Global left ventricular systolic function appears normal. Visually estimated ejection fraction is 60-65%. The LV Doppler derived stroke volume   equals 98.0 ccs.   The E/e'(lat) is 7, consistent with normal diastolic function.     Right Atrium: The right atrium is normal in size, measuring 4.7 cm in length in the apical view.     Right Ventricle: The right ventricle is normal in size. Global right ventricular systolic function appears normal. The estimated PA systolic pressure is 26 mmHg.     Aortic Valve:  Aortic valve is normal in structure with normal leaflet mobility. The peak gradient obtained across the aortic valve is 8.0 mmHg, with a mean gradient of 4.0 mmHg. Using a left ventricular outflow tract diameter of 2.2 cm, a left   ventricular outflow tract velocity time integral of 26.25 cm, and a peak instantaneous transvalvular velocity time  integral of 31.0 cm, the calculated aortic valve area is 3.16 cm2.     Mitral Valve:  Mitral valve is normal in structure with normal leaflet mobility.     Tricuspid Valve:  Tricuspid valve is normal in structure with normal leaflet mobility.     Pulmonary Valve:  Pulmonary valve is normal in structure with normal leaflet mobility.     IVC: IVC is normal in size and collapses > 50% with a sniff, suggesting normal right atrial pressure of 3 mmHg.     Atrial Septum: The atrial septum is intact.     Intracavitary: There is no evidence of pericardial effusion, intracavity mass, thrombi, or vegetation.         CONCLUSIONS     1 - Moderate left atrial enlargement.     2 - Concentric remodeling.     3 - Normal left ventricular systolic function (EF 60-65%).     4 - Normal left ventricular diastolic function.     5 - Normal right ventricular systolic function .         This document has been electronically    SIGNED BY: Rachid Romero MD On: 01/22/2015 18:29    and Stress Test:     The perfusion scan is free of evidence from myocardial ischemia or injury.    Gated perfusion images showed an ejection fraction of 65% at rest and 64% post stress.      The EKG portion of this study is negative for ischemia.    Pending Diagnostic Studies:     Procedure Component Value Units Date/Time    Hepatitis C antibody [[id number]] Collected:  03/05/20 1008    Order Status:  Sent Lab Status:  In process Updated:  03/05/20 2038    Specimen:  Blood          Medications:  Reconciled Home Medications:      Medication List      CONTINUE taking these medications    aspirin 325 MG tablet  Take 325 mg by mouth once daily.     atorvastatin 40 MG tablet  Commonly known as:  LIPITOR  atorvastatin 40 mg tablet   Take 1 tablet every day by oral route for 30 days.     CeleXA 20 MG tablet  Generic drug:  citalopram  Take 20 mg by mouth once daily.     cyanocobalamin (vitamin B-12) 50 mcg tablet  Take by mouth.     lactulose 20 gram Pack  Commonly known  as:  CEPHULAC  Take 1 packet (20 g total) by mouth 3 (three) times daily.     levothyroxine 25 MCG tablet  Commonly known as:  SYNTHROID  Take 25 mcg by mouth once daily.     lisinopril 10 MG tablet  Take 10 mg by mouth once daily.     omeprazole 40 MG capsule  Commonly known as:  PRILOSEC  Take 40 mg by mouth once daily.     VITAMIN D-3 ORAL  Take by mouth.        STOP taking these medications    amLODIPine 5 MG tablet  Commonly known as:  NORVASC     potassium chloride 10 MEQ Tbsr  Commonly known as:  KLOR-CON            Indwelling Lines/Drains at time of discharge:   Lines/Drains/Airways     None                 Time spent on the discharge of patient: 35 minutes  Patient was seen and examined on the date of discharge and determined to be suitable for discharge.         RAVI Henning  Department of Hospital Medicine  [facility]

## 2020-03-06 NOTE — ASSESSMENT & PLAN NOTE
- Place in observation   - Trend troponin  - ECHO  - Cardiology consult  - Check lipid panel  - Resume ASA/statin  - Cardiac diet  - NPO after MN  - Analgesia PRN

## 2020-03-06 NOTE — H&P
[facility] Hospital Medicine  History & Physical    Patient Name: Scooter Ochoa  MRN: [id number]  Admission Date: 3/5/2020  Attending Physician: Norbert Sage MD   Primary Care Provider: Rosita Mcgrath MD         Patient information was obtained from patient and ER records.     Subjective:     Principal Problem:Troponin level elevated    Chief Complaint:   Chief Complaint   Patient presents with    Chest Pain     right arm pain and weakness to left leg.        HPI: Scooter Ochoa is a 53 yo female with a PMH of hypertension, hyperlipidemia, previous MI and previous CVA who presented to [facility] ER with complaints of substernal chest pain that radiates to her right arm and neck. Associated symptoms include shortness of breath and headache. The patient denies any modifying factors. Patient denies fever, chills, cough, pnd, orthopnea, palpitations, diaphoresis, blurred vision, numbness, tingling, dizziness, localized weakness, n/v/d, abdominal pain, blood in stools, melena, hematemesis, urinary frequency, urgency, dysuria or hematuria. In the ER the patient was found to have an elevated troponin of 0.138. The case was discussed with Cardiology who recommended a heparin gtt and trending troponins. The patient is being placed in observation to rule out ACS.         Past Medical History:   Diagnosis Date    Coronary atherosclerosis 2019    Depression     GERD (gastroesophageal reflux disease)     High cholesterol     Hyperlipidemia     Hypertension     Hypothyroidism     Metabolic syndrome X 2019    MI (myocardial infarction)     Stroke     Vitamin D deficiency 2019       Past Surgical History:   Procedure Laterality Date    ANGIOPLASTY       SECTION      x 2    HYSTERECTOMY      OOPHORECTOMY         Review of patient's allergies indicates:  No Known Allergies    No current facility-administered medications on file prior to encounter.      Current Outpatient  Medications on File Prior to Encounter   Medication Sig    aspirin 325 MG tablet Take 325 mg by mouth once daily.    atorvastatin (LIPITOR) 40 MG tablet atorvastatin 40 mg tablet   Take 1 tablet every day by oral route for 30 days.    CALCIUM CARBONATE/VITAMIN D3 (VITAMIN D-3 ORAL) Take by mouth.    citalopram (CELEXA) 20 MG tablet Take 20 mg by mouth once daily.    lisinopril 10 MG tablet Take 10 mg by mouth once daily.    omeprazole (PRILOSEC) 40 MG capsule Take 40 mg by mouth once daily.    amLODIPine (NORVASC) 5 MG tablet Take 5 mg by mouth once daily.    cyanocobalamin, vitamin B-12, 50 mcg tablet Take by mouth.    lactulose (CEPHULAC) 20 gram Pack Take 1 packet (20 g total) by mouth 3 (three) times daily.    levothyroxine (SYNTHROID) 25 MCG tablet Take 25 mcg by mouth once daily.    potassium chloride (KLOR-CON) 10 MEQ TbSR potassium chloride ER 10 mEq tablet,extended release   Take 1 tablet every day by oral route for 30 days.     Family History     History reviewed, none pertinent.         Tobacco Use    Smoking status: Never Smoker    Smokeless tobacco: Never Used   Substance and Sexual Activity    Alcohol use: Yes     Comment: occasionally    Drug use: No    Sexual activity: Yes     Partners: Male     Review of Systems   Constitutional: Negative for activity change, appetite change, chills, diaphoresis, fatigue, fever and unexpected weight change.   HENT: Negative for congestion, drooling, facial swelling, rhinorrhea, sinus pressure, sneezing and sore throat.         Neck pain    Eyes: Negative for discharge, redness, itching and visual disturbance.   Respiratory: Positive for shortness of breath. Negative for apnea, cough, choking, chest tightness, wheezing and stridor.    Cardiovascular: Positive for chest pain. Negative for palpitations and leg swelling.   Gastrointestinal: Negative for abdominal distention, abdominal pain, anal bleeding, blood in stool, constipation, diarrhea, nausea  and vomiting.   Genitourinary: Negative for decreased urine volume, difficulty urinating, dysuria, frequency, hematuria, pelvic pain, urgency, vaginal bleeding and vaginal discharge.   Musculoskeletal: Negative for arthralgias, back pain, gait problem, joint swelling, myalgias, neck pain and neck stiffness.   Skin: Negative for color change, pallor, rash and wound.   Neurological: Positive for headaches. Negative for dizziness, seizures, facial asymmetry, speech difficulty, weakness, light-headedness and numbness.   Psychiatric/Behavioral: Negative for agitation, confusion, hallucinations and suicidal ideas.   All other systems reviewed and are negative.    Objective:     Vital Signs (Most Recent):  Temp: 97.5 °F (36.4 °C) (03/05/20 1925)  Pulse: (!) 46 (03/05/20 1925)  Resp: 16 (03/05/20 1925)  BP: (!) 123/58 (03/05/20 1925)  SpO2: 99 % (03/05/20 1925) Vital Signs (24h Range):  Temp:  [97 °F (36.1 °C)-97.5 °F (36.4 °C)] 97.5 °F (36.4 °C)  Pulse:  [42-56] 46  Resp:  [16-25] 16  SpO2:  [96 %-100 %] 99 %  BP: (108-156)/(53-89) 123/58     Weight: 85.6 kg (188 lb 11.4 oz)  Body mass index is 33.43 kg/m².    Physical Exam   Constitutional: She is oriented to person, place, and time. She appears well-developed and well-nourished.   Obese   HENT:   Head: Normocephalic and atraumatic.   Eyes: Pupils are equal, round, and reactive to light. Conjunctivae and EOM are normal.   Neck: Normal range of motion. Neck supple.   Cardiovascular: Regular rhythm, normal heart sounds and intact distal pulses.   No murmur heard.  Bradycardic   Pulmonary/Chest: Effort normal and breath sounds normal. No respiratory distress.   Abdominal: Soft. Bowel sounds are normal. She exhibits no distension. There is no tenderness.   Musculoskeletal: Normal range of motion. She exhibits no edema, tenderness or deformity.   Neurological: She is alert and oriented to person, place, and time. She has normal reflexes.   Skin: Skin is warm and dry. No  erythema.   Psychiatric: She has a normal mood and affect. Her behavior is normal.   Nursing note and vitals reviewed.        CRANIAL NERVES     CN III, IV, VI   Pupils are equal, round, and reactive to light.  Extraocular motions are normal.        Significant Labs: All pertinent labs within the past 24 hours have been reviewed.    Significant Imaging:   Imaging Results          CTA Chest Abdomen Non Coronary (Final result)  Result time 03/05/20 10:03:40    Final result by Pierre Tomas MD (03/05/20 10:03:40)                 Impression:      Cardiomegaly.  No evidence of aortic dissection.    Moderate constipation.    Mild atelectasis or infiltrate left lower lobe.    All CT scans at this facility use dose modulation, iterative reconstruction and/or weight based dosing when appropriate to reduce radiation dose to as low as reasonably achievable.      Electronically signed by: Pierre Tomas MD  Date:    03/05/2020  Time:    10:03             Narrative:    EXAMINATION:  CTA CHEST ABDOMEN NON CORONARY (XPD)    CLINICAL HISTORY:  Aortic dz, non-traumatic, known or suspect;    TECHNIQUE:  The patient was surveyed from the thoracic inlet through the upper pelvis after the administration of 100 cc Omni 350 IV contrast using CTA angiography technique and data was reconstructed for coronal, sagittal, and axial images.    COMPARISON:  10/10/2019    FINDINGS:  The soft tissues at the base of the neck are unremarkable.  The thyroid gland is normal in size and configuration.  There is no abnormal lymph node enlargement.    There is no axillary, mediastinal, or hilar lymphadenopathy.  Shotty axillary adenopathy is noted.  The hilar contours are unremarkable.  The esophagus is normal in course and contour.    The thoracic aorta is normal in course, caliber, and contour without significant atherosclerotic calcifications.Tortuosity of the descending aorta can be seen with chronic hypertension.    Cardiomegaly is noted.  No  significant coronary calcifications.  No pericardial effusion.    The trachea and proximal airways are patent.    Decreased lung volumes are noted.  Bilateral dependent changes.  Mild atelectasis or infiltrate left lower lobe.    The liver is normal in size and attenuation with no focal hepatic abnormality.  The gallbladder shows no evidence of stones or pericholecystic fluid.  There is no intra-or extrahepatic biliary ductal dilatation.    The stomach, spleen, pancreas, and adrenal glands are unremarkable.    The kidneys are normal in size and location.  Small benign cyst right lower pole.  There is no evidence of hydronephrosis.  The ureters appear normal in course and caliber without evidence of ureteral dilatation. The urinary bladder demonstrates no significant abnormality.    The abdominal aorta is normal in course and caliber without significant atherosclerotic calcifications.  Mild narrowing at the origin of the right renal artery.  Mesenteric arteries are mildly patent.    Small hiatal hernia the visualized loops of small bowel show no evidence of obstruction or inflammation. Large bowel is unremarkable with moderate constipation.  There is no ascites, free fluid, or intraperitoneal free air. There is no evidence of lymph node enlargement in the abdomen or pelvis.    Small fat containing umbilical hernia.    When viewed with bone windows the osseous structures are unremarkable.  The extraperitoneal soft tissues are unremarkable.                               CT Head Without Contrast (Final result)  Result time 03/05/20 08:25:40    Final result by Pierre Tomas MD (03/05/20 08:25:40)                 Impression:      Mild chronic microvascular ischemic changes.    All CT scans at this facility use dose modulation, iterative reconstruction, and/or weight based dosing when appropriate to reduce radiation dose to as low as reasonable achievable.      Electronically signed by: Pierre Tomas  MD  Date:    03/05/2020  Time:    08:25             Narrative:    EXAMINATION:  CT HEAD WITHOUT CONTRAST    CLINICAL HISTORY:  Focal neuro deficit, new, fixed or worsening, <6 hours;    TECHNIQUE:  Low dose axial CT images obtained throughout the head without intravenous contrast. Sagittal and coronal reconstructions were performed.    COMPARISON:  None.    FINDINGS:  Intracranial compartment:    The brain parenchyma demonstrates areas of decreased attenuation with mild periventricular white matter consistent with chronic microvascular ischemic changes.  No parenchymal mass, hemorrhage, edema or major vascular distribution infarct.  Vascular calcifications are noted.    Mild prominence of the sulci and ventricles are consistent with age-related involutional changes.    No extra-axial blood or fluid collections.    Skull/extracranial contents (limited evaluation): No fracture. Mastoid air cells and paranasal sinuses are essentially clear.                               X-Ray Chest AP Portable (Final result)  Result time 03/05/20 08:20:26    Final result by Gomez Rodriguez MD (03/05/20 08:20:26)                 Impression:      No acute abnormality.      Electronically signed by: Gomez Rodriguez  Date:    03/05/2020  Time:    08:20             Narrative:    EXAMINATION:  XR CHEST AP PORTABLE    CLINICAL HISTORY:  Chest pain;.    TECHNIQUE:  Single frontal portable view of the chest was performed.    COMPARISON:  10/10/2019    FINDINGS:  Support devices: None    Unchanged elevation of the left hemidiaphragm.  Posterior lung bases are not well visualized due to AP technique.  Pulmonary vascular congestion without rupinder pulmonary edema.  No pleural effusion or pneumothorax.    Unchanged mild cardiomegaly..  The hilar and mediastinal contours are unremarkable.    Bones are intact.                                  Assessment/Plan:     * Troponin level elevated  - Place in observation   - Trend troponin  - ECHO  - Cardiology  consult  - Check lipid panel  - Resume ASA/statin  - Cardiac diet  - NPO after MN  - Analgesia PRN    Thyroid disease  - Resume synthroid      Recurrent major depression  - Resume celexa      HTN (hypertension)  - Monitor VS   - Resume home meds   - Hydralazine PRN      VTE Risk Mitigation (From admission, onward)         Ordered     IP VTE HIGH RISK PATIENT  Once      03/05/20 1909     heparin 25,000 units in dextrose 5% 250 mL (100 units/mL) infusion LOW INTENSITY nomogram - [facility]  Continuous     Question:  Heparin Infusion Adjustment (DO NOT MODIFY ANSWER)  Answer:  [email or web address]\epic\Images\Pharmacy\HeparinInfusions\heparin LOW INTENSITY nomogram for [facility] [id number]    03/05/20 1000     heparin 25,000 units in dextrose 5% (100 units/ml) IV bolus from bag - ADDITIONAL PRN BOLUS - 60 units/kg (max bolus 4000 units)  As needed (PRN)     Question:  Heparin Infusion Adjustment (DO NOT MODIFY ANSWER)  Answer:  [email or web address]\epic\Images\Pharmacy\HeparinInfusions\heparin LOW INTENSITY nomogram for [facility] [id number]    03/05/20 1000     heparin 25,000 units in dextrose 5% (100 units/ml) IV bolus from bag - ADDITIONAL PRN BOLUS - 30 units/kg (max bolus 4000 units)  As needed (PRN)     Question:  Heparin Infusion Adjustment (DO NOT MODIFY ANSWER)  Answer:  [email or web address]\epic\Images\Pharmacy\HeparinInfusions\heparin LOW INTENSITY nomogram for [facility] [id number]    03/05/20 1000                   Osbaldo Reddy NP  Department of Hospital Medicine   [facility]

## 2020-03-06 NOTE — HOSPITAL COURSE
Scooter Ochoa is a 54 year old female placed on observation for chest pain. Serial troponin 0.138>>0.034>>0.034. Heparin gtt initiated. Cardiology consulted and recommended stress test. Stress test free of evidence from myocardial ischemia or injury. Discussed with Dr. Saab (cardiology) okay to discharge from cardiology standpoint. Recommended BP control. Chest pain has resolved. Pt primary cardiologist was Dr. Jordon Clemente with [facility] but has retired. Pt will follow up with Dr. Yoli Bolaños at [facility] on 02/25/20 at 2:00 PM for hospital follow up. This patient was seen and examined on the date of discharge and determined suitable for discharge.

## 2020-03-06 NOTE — HPI
"Scooter Ochoa is a 54 year old female who presented to [facility]- due to chest pain with radiation to right arm and neck area with associated shortness of breath and headache. Her current medical conditions include HTN, HLP, H/O CVA, Metabolic syndrome, GERD. ED workup revealed initial troponin 0.138. She was started on IV heparin gtt in ED and placed in observation under the care of hospital medicine. Chart reviewed, patient seen and examined. ECHO pending. Plan for MPI stress test today. Denies chest pain on exam today. NO shortness of breath, SANTANA or palpitations. She does endorse monthly episodes of chest pain but reports that yesterday's episode was much worse than her "normal" chest pain episodes. She does endorse issues with shortness of breath when she moves her garbage can to the road or when she overexerts herself. She does endorse nightly snoring issues and will need OP sleep study after discharge. Further recs to follow.   "

## 2020-03-06 NOTE — SUBJECTIVE & OBJECTIVE
Past Medical History:   Diagnosis Date    Coronary atherosclerosis 2019    Depression     GERD (gastroesophageal reflux disease)     High cholesterol     Hyperlipidemia     Hypertension     Hypothyroidism     Metabolic syndrome X 2019    MI (myocardial infarction)     Stroke     Vitamin D deficiency 2019       Past Surgical History:   Procedure Laterality Date    ANGIOPLASTY       SECTION      x 2    HYSTERECTOMY      OOPHORECTOMY         Review of patient's allergies indicates:  No Known Allergies    No current facility-administered medications on file prior to encounter.      Current Outpatient Medications on File Prior to Encounter   Medication Sig    aspirin 325 MG tablet Take 325 mg by mouth once daily.    atorvastatin (LIPITOR) 40 MG tablet atorvastatin 40 mg tablet   Take 1 tablet every day by oral route for 30 days.    CALCIUM CARBONATE/VITAMIN D3 (VITAMIN D-3 ORAL) Take by mouth.    citalopram (CELEXA) 20 MG tablet Take 20 mg by mouth once daily.    lisinopril 10 MG tablet Take 10 mg by mouth once daily.    omeprazole (PRILOSEC) 40 MG capsule Take 40 mg by mouth once daily.    amLODIPine (NORVASC) 5 MG tablet Take 5 mg by mouth once daily.    cyanocobalamin, vitamin B-12, 50 mcg tablet Take by mouth.    lactulose (CEPHULAC) 20 gram Pack Take 1 packet (20 g total) by mouth 3 (three) times daily.    levothyroxine (SYNTHROID) 25 MCG tablet Take 25 mcg by mouth once daily.    potassium chloride (KLOR-CON) 10 MEQ TbSR potassium chloride ER 10 mEq tablet,extended release   Take 1 tablet every day by oral route for 30 days.     Family History     None        Tobacco Use    Smoking status: Never Smoker    Smokeless tobacco: Never Used   Substance and Sexual Activity    Alcohol use: Yes     Comment: occasionally    Drug use: No    Sexual activity: Yes     Partners: Male     Review of Systems   Constitution: Positive for malaise/fatigue.   HENT: Negative for  hearing loss and hoarse voice.    Eyes: Negative for blurred vision and visual disturbance.   Cardiovascular: Positive for chest pain. Negative for claudication, dyspnea on exertion, irregular heartbeat, leg swelling, near-syncope, orthopnea, palpitations, paroxysmal nocturnal dyspnea and syncope.   Respiratory: Positive for shortness of breath. Negative for cough, hemoptysis, sleep disturbances due to breathing, snoring and wheezing.    Endocrine: Negative for cold intolerance and heat intolerance.   Hematologic/Lymphatic: Bruises/bleeds easily.   Skin: Negative for color change, dry skin and nail changes.   Musculoskeletal: Positive for arthritis, back pain, joint pain (right shoulder and right neck) and myalgias.   Gastrointestinal: Negative for bloating, abdominal pain, constipation, nausea and vomiting.   Genitourinary: Negative for dysuria, flank pain, hematuria and hesitancy.   Neurological: Negative for headaches, light-headedness, loss of balance, numbness, paresthesias and weakness.   Psychiatric/Behavioral: Negative for altered mental status.   Allergic/Immunologic: Negative for environmental allergies.     Objective:     Vital Signs (Most Recent):  Temp: 96.9 °F (36.1 °C) (03/06/20 0728)  Pulse: (!) 47 (03/06/20 0728)  Resp: 16 (03/06/20 0728)  BP: (!) 126/58 (03/06/20 0728)  SpO2: 96 % (03/06/20 0728) Vital Signs (24h Range):  Temp:  [96.9 °F (36.1 °C)-97.5 °F (36.4 °C)] 96.9 °F (36.1 °C)  Pulse:  [42-59] 47  Resp:  [16-25] 16  SpO2:  [94 %-100 %] 96 %  BP: (102-152)/(53-69) 126/58     Weight: 88.9 kg (195 lb 15.8 oz)  Body mass index is 34.72 kg/m².    SpO2: 96 %  O2 Device (Oxygen Therapy): room air      Intake/Output Summary (Last 24 hours) at 3/6/2020 0812  Last data filed at 3/6/2020 0500  Gross per 24 hour   Intake 621.36 ml   Output --   Net 621.36 ml       Lines/Drains/Airways     None                 Physical Exam   Constitutional: She is oriented to person, place, and time. She appears  well-developed and well-nourished. No distress.   HENT:   Head: Normocephalic and atraumatic.   Eyes: Pupils are equal, round, and reactive to light.   Neck: Normal range of motion and full passive range of motion without pain. Neck supple. No JVD present.   Cardiovascular: Regular rhythm, S1 normal, S2 normal and intact distal pulses. Bradycardia present. PMI is not displaced.   No murmur heard.  Pulses:       Radial pulses are 2+ on the right side, and 2+ on the left side.        Dorsalis pedis pulses are 2+ on the right side, and 2+ on the left side.   CHEST PAIN FREE   Pulmonary/Chest: Effort normal and breath sounds normal. No accessory muscle usage. No respiratory distress. She has no decreased breath sounds. She has no wheezes. She has no rales.   Abdominal: Soft. Bowel sounds are normal. She exhibits no distension. There is no tenderness.   Obese abdomen   Musculoskeletal: Normal range of motion. She exhibits no edema.        Right ankle: She exhibits no swelling.        Left ankle: She exhibits no swelling.   Neurological: She is alert and oriented to person, place, and time.   Skin: Skin is warm and dry. She is not diaphoretic. No cyanosis. Nails show no clubbing.   Psychiatric: She has a normal mood and affect. Her speech is normal and behavior is normal. Judgment and thought content normal. Cognition and memory are normal.   Nursing note and vitals reviewed.      Significant Labs:   BMP:   Recent Labs   Lab 03/05/20  0806 03/06/20  0716   GLU 98 91    139   K 4.7 4.1    104   CO2 22* 26   BUN 14 12   CREATININE 0.9 0.8   CALCIUM 9.4 9.4   , CBC   Recent Labs   Lab 03/05/20  0806 03/06/20  0716   WBC 8.12 8.15   HGB 12.0 11.6*   HCT 38.2 37.0    251   , Troponin   Recent Labs   Lab 03/05/20  1928 03/06/20  0131 03/06/20  0716   TROPONINI 0.034* 0.034* 0.035*    and All pertinent lab results from the last 24 hours have been reviewed.    Significant Imaging: Echocardiogram:   2D echo with  color flow doppler:   Results for orders placed or performed during the hospital encounter of 01/21/15   2D echo with color flow doppler   Result Value Ref Range    QEF 60 55 - 65    Diastolic Dysfunction No     Est. PA Systolic Pressure 26.04     Narrative    TEST DESCRIPTION   Technical Quality: This is a portable study performed at the patient's bedside. This is a technically adequate study.     Aorta: The aortic root is normal in size, measuring 2.3 cm at sinotubular junction.     Left Atrium: The left atrial volume index is moderately enlarged, measuring 35.09 cc/m2.     Left Ventricle: The left ventricle is normal in size, with an end-diastolic diameter of 3.9 cm, and an end-systolic diameter of 2.1 cm. LV wall thickness is normal, with the septum and the posterior wall each measuring 1.0 cm across. Relative wall   thickness was increased at 0.51, and the LV mass index was 74.8 g/m2 consistent with concentric remodeling. Global left ventricular systolic function appears normal. Visually estimated ejection fraction is 60-65%. The LV Doppler derived stroke volume   equals 98.0 ccs.   The E/e'(lat) is 7, consistent with normal diastolic function.     Right Atrium: The right atrium is normal in size, measuring 4.7 cm in length in the apical view.     Right Ventricle: The right ventricle is normal in size. Global right ventricular systolic function appears normal. The estimated PA systolic pressure is 26 mmHg.     Aortic Valve:  Aortic valve is normal in structure with normal leaflet mobility. The peak gradient obtained across the aortic valve is 8.0 mmHg, with a mean gradient of 4.0 mmHg. Using a left ventricular outflow tract diameter of 2.2 cm, a left   ventricular outflow tract velocity time integral of 26.25 cm, and a peak instantaneous transvalvular velocity time integral of 31.0 cm, the calculated aortic valve area is 3.16 cm2.     Mitral Valve:  Mitral valve is normal in structure with normal leaflet  mobility.     Tricuspid Valve:  Tricuspid valve is normal in structure with normal leaflet mobility.     Pulmonary Valve:  Pulmonary valve is normal in structure with normal leaflet mobility.     IVC: IVC is normal in size and collapses > 50% with a sniff, suggesting normal right atrial pressure of 3 mmHg.     Atrial Septum: The atrial septum is intact.     Intracavitary: There is no evidence of pericardial effusion, intracavity mass, thrombi, or vegetation.         CONCLUSIONS     1 - Moderate left atrial enlargement.     2 - Concentric remodeling.     3 - Normal left ventricular systolic function (EF 60-65%).     4 - Normal left ventricular diastolic function.     5 - Normal right ventricular systolic function .         This document has been electronically    SIGNED BY: Rachid Romero MD On: 01/22/2015 18:29    and Transthoracic echo (TTE) complete (Cupid Only):   Results for orders placed or performed during the hospital encounter of 03/05/20   Echo Color Flow Doppler? Yes   Result Value Ref Range    Ascending aorta 2.74 cm    STJ 2.96 cm    AV mean gradient 4 mmHg    Ao peak jian 1.40 m/s    Ao VTI 37.43 cm    IVRT 105.61 msec    IVS 1.07 0.6 - 1.1 cm    LA size 2.99 cm    Left Atrium Major Axis 4.75 cm    Left Atrium Minor Axis 2.85 cm    LVIDD 4.03 3.5 - 6.0 cm    LVIDS 2.88 2.1 - 4.0 cm    LVOT diameter 2.12 cm    LVOT peak VTI 33.35 cm    PW 1.24 (A) 0.6 - 1.1 cm    MV Peak A Jian 0.79 m/s    E wave decelartion time 304.23 msec    MV Peak E Jian 0.75 m/s    PV Peak D Jian 0.48 m/s    PV Peak S Jian 0.58 m/s    RA Major Axis 4.31 cm    Sinus 3.10 cm    TAPSE 2.62 cm    TR Max Jian 2.80 m/s    TDI LATERAL 0.09 m/s    TDI SEPTAL 0.10 m/s    LA WIDTH 4.01 cm    Ao root annulus 3.40 cm    LV Diastolic Volume 71.05 mL    LV Systolic Volume 31.65 mL    LVOT peak jian 1.17 m/s    LV LATERAL E/E' RATIO 8.33 m/s    LV SEPTAL E/E' RATIO 7.50 m/s    FS 29 %    LA volume 36.31 cm3    LV mass 158.17 g    Left Ventricle Relative  Wall Thickness 0.62 cm    AV valve area 3.14 cm2    AV Velocity Ratio 0.84     AV index (prosthetic) 0.89     E/A ratio 0.95     Mean e' 0.10 m/s    Pulm vein S/D ratio 1.21     LVOT area 3.5 cm2    LVOT stroke volume 117.66 cm3    AV peak gradient 8 mmHg    E/E' ratio 7.89 m/s    Triscuspid Valve Regurgitation Peak Gradient 31 mmHg    BSA 1.98 m2    LV Systolic Volume Index 16.5 mL/m2    LV Diastolic Volume Index 37.14 mL/m2    LA Volume Index 19.0 mL/m2    LV Mass Index 83 g/m2    and X-Ray: CXR: X-Ray Chest 1 View (CXR): No results found for this visit on 03/05/20. and X-Ray Chest PA and Lateral (CXR): No results found for this visit on 03/05/20.

## 2020-03-06 NOTE — CONSULTS
"[facility]  Cardiology  Consult Note    Patient Name: Scooter Ochoa  MRN: [id number]  Admission Date: 3/5/2020  Hospital Length of Stay: 0 days  Code Status: Full Code   Attending Provider: Ruben Marie, *   Consulting Provider: BARI Peña  Primary Care Physician: Rosita Mcgrath MD  Principal Problem:Troponin level elevated    Patient information was obtained from patient, past medical records and ER records.     Inpatient consult to Cardiology  Consult performed by: BARI Ritchie  Consult ordered by: Lloyd Melendez MD        Subjective:     Chief Complaint:  Chest pain     HPI:   Scooter Ochoa is a 54 year old female who presented to [facility] due to chest pain with radiation to right arm and neck area with associated shortness of breath and headache. Her current medical conditions include HTN, HLP, H/O CVA, Metabolic syndrome, GERD. ED workup revealed initial troponin 0.138. She was started on IV heparin gtt in ED and placed in observation under the care of hospital medicine. Chart reviewed, patient seen and examined. ECHO pending. Plan for MPI stress test today. Denies chest pain on exam today. NO shortness of breath, SANTANA or palpitations. She does endorse monthly episodes of chest pain but reports that yesterday's episode was much worse than her "normal" chest pain episodes. She does endorse issues with shortness of breath when she moves her garbage can to the road or when she overexerts herself. She does endorse nightly snoring issues and will need OP sleep study after discharge. Further recs to follow.     Past Medical History:   Diagnosis Date    Coronary atherosclerosis 5/24/2019    Depression     GERD (gastroesophageal reflux disease)     High cholesterol     Hyperlipidemia     Hypertension     Hypothyroidism     Metabolic syndrome X 5/24/2019    MI (myocardial infarction)     Stroke     Vitamin D deficiency 5/24/2019       Past Surgical History: "   Procedure Laterality Date    ANGIOPLASTY       SECTION      x 2    HYSTERECTOMY      OOPHORECTOMY         Review of patient's allergies indicates:  No Known Allergies    No current facility-administered medications on file prior to encounter.      Current Outpatient Medications on File Prior to Encounter   Medication Sig    aspirin 325 MG tablet Take 325 mg by mouth once daily.    atorvastatin (LIPITOR) 40 MG tablet atorvastatin 40 mg tablet   Take 1 tablet every day by oral route for 30 days.    CALCIUM CARBONATE/VITAMIN D3 (VITAMIN D-3 ORAL) Take by mouth.    citalopram (CELEXA) 20 MG tablet Take 20 mg by mouth once daily.    lisinopril 10 MG tablet Take 10 mg by mouth once daily.    omeprazole (PRILOSEC) 40 MG capsule Take 40 mg by mouth once daily.    amLODIPine (NORVASC) 5 MG tablet Take 5 mg by mouth once daily.    cyanocobalamin, vitamin B-12, 50 mcg tablet Take by mouth.    lactulose (CEPHULAC) 20 gram Pack Take 1 packet (20 g total) by mouth 3 (three) times daily.    levothyroxine (SYNTHROID) 25 MCG tablet Take 25 mcg by mouth once daily.    potassium chloride (KLOR-CON) 10 MEQ TbSR potassium chloride ER 10 mEq tablet,extended release   Take 1 tablet every day by oral route for 30 days.     Family History     None        Tobacco Use    Smoking status: Never Smoker    Smokeless tobacco: Never Used   Substance and Sexual Activity    Alcohol use: Yes     Comment: occasionally    Drug use: No    Sexual activity: Yes     Partners: Male     Review of Systems   Constitution: Positive for malaise/fatigue.   HENT: Negative for hearing loss and hoarse voice.    Eyes: Negative for blurred vision and visual disturbance.   Cardiovascular: Positive for chest pain. Negative for claudication, dyspnea on exertion, irregular heartbeat, leg swelling, near-syncope, orthopnea, palpitations, paroxysmal nocturnal dyspnea and syncope.   Respiratory: Positive for shortness of breath. Negative for  cough, hemoptysis, sleep disturbances due to breathing, snoring and wheezing.    Endocrine: Negative for cold intolerance and heat intolerance.   Hematologic/Lymphatic: Bruises/bleeds easily.   Skin: Negative for color change, dry skin and nail changes.   Musculoskeletal: Positive for arthritis, back pain, joint pain (right shoulder and right neck) and myalgias.   Gastrointestinal: Negative for bloating, abdominal pain, constipation, nausea and vomiting.   Genitourinary: Negative for dysuria, flank pain, hematuria and hesitancy.   Neurological: Negative for headaches, light-headedness, loss of balance, numbness, paresthesias and weakness.   Psychiatric/Behavioral: Negative for altered mental status.   Allergic/Immunologic: Negative for environmental allergies.     Objective:     Vital Signs (Most Recent):  Temp: 96.9 °F (36.1 °C) (03/06/20 0728)  Pulse: (!) 47 (03/06/20 0728)  Resp: 16 (03/06/20 0728)  BP: (!) 126/58 (03/06/20 0728)  SpO2: 96 % (03/06/20 0728) Vital Signs (24h Range):  Temp:  [96.9 °F (36.1 °C)-97.5 °F (36.4 °C)] 96.9 °F (36.1 °C)  Pulse:  [42-59] 47  Resp:  [16-25] 16  SpO2:  [94 %-100 %] 96 %  BP: (102-152)/(53-69) 126/58     Weight: 88.9 kg (195 lb 15.8 oz)  Body mass index is 34.72 kg/m².    SpO2: 96 %  O2 Device (Oxygen Therapy): room air      Intake/Output Summary (Last 24 hours) at 3/6/2020 0812  Last data filed at 3/6/2020 0500  Gross per 24 hour   Intake 621.36 ml   Output --   Net 621.36 ml       Lines/Drains/Airways     None                 Physical Exam   Constitutional: She is oriented to person, place, and time. She appears well-developed and well-nourished. No distress.   HENT:   Head: Normocephalic and atraumatic.   Eyes: Pupils are equal, round, and reactive to light.   Neck: Normal range of motion and full passive range of motion without pain. Neck supple. No JVD present.   Cardiovascular: Regular rhythm, S1 normal, S2 normal and intact distal pulses. Bradycardia present. PMI is  not displaced.   No murmur heard.  Pulses:       Radial pulses are 2+ on the right side, and 2+ on the left side.        Dorsalis pedis pulses are 2+ on the right side, and 2+ on the left side.   CHEST PAIN FREE   Pulmonary/Chest: Effort normal and breath sounds normal. No accessory muscle usage. No respiratory distress. She has no decreased breath sounds. She has no wheezes. She has no rales.   Abdominal: Soft. Bowel sounds are normal. She exhibits no distension. There is no tenderness.   Obese abdomen   Musculoskeletal: Normal range of motion. She exhibits no edema.        Right ankle: She exhibits no swelling.        Left ankle: She exhibits no swelling.   Neurological: She is alert and oriented to person, place, and time.   Skin: Skin is warm and dry. She is not diaphoretic. No cyanosis. Nails show no clubbing.   Psychiatric: She has a normal mood and affect. Her speech is normal and behavior is normal. Judgment and thought content normal. Cognition and memory are normal.   Nursing note and vitals reviewed.      Significant Labs:   BMP:   Recent Labs   Lab 03/05/20  0806 03/06/20  0716   GLU 98 91    139   K 4.7 4.1    104   CO2 22* 26   BUN 14 12   CREATININE 0.9 0.8   CALCIUM 9.4 9.4   , CBC   Recent Labs   Lab 03/05/20  0806 03/06/20  0716   WBC 8.12 8.15   HGB 12.0 11.6*   HCT 38.2 37.0    251   , Troponin   Recent Labs   Lab 03/05/20  1928 03/06/20  0131 03/06/20  0716   TROPONINI 0.034* 0.034* 0.035*    and All pertinent lab results from the last 24 hours have been reviewed.    Significant Imaging: Echocardiogram:   2D echo with color flow doppler:   Results for orders placed or performed during the hospital encounter of 01/21/15   2D echo with color flow doppler   Result Value Ref Range    QEF 60 55 - 65    Diastolic Dysfunction No     Est. PA Systolic Pressure 26.04     Narrative    TEST DESCRIPTION   Technical Quality: This is a portable study performed at the patient's bedside. This  is a technically adequate study.     Aorta: The aortic root is normal in size, measuring 2.3 cm at sinotubular junction.     Left Atrium: The left atrial volume index is moderately enlarged, measuring 35.09 cc/m2.     Left Ventricle: The left ventricle is normal in size, with an end-diastolic diameter of 3.9 cm, and an end-systolic diameter of 2.1 cm. LV wall thickness is normal, with the septum and the posterior wall each measuring 1.0 cm across. Relative wall   thickness was increased at 0.51, and the LV mass index was 74.8 g/m2 consistent with concentric remodeling. Global left ventricular systolic function appears normal. Visually estimated ejection fraction is 60-65%. The LV Doppler derived stroke volume   equals 98.0 ccs.   The E/e'(lat) is 7, consistent with normal diastolic function.     Right Atrium: The right atrium is normal in size, measuring 4.7 cm in length in the apical view.     Right Ventricle: The right ventricle is normal in size. Global right ventricular systolic function appears normal. The estimated PA systolic pressure is 26 mmHg.     Aortic Valve:  Aortic valve is normal in structure with normal leaflet mobility. The peak gradient obtained across the aortic valve is 8.0 mmHg, with a mean gradient of 4.0 mmHg. Using a left ventricular outflow tract diameter of 2.2 cm, a left   ventricular outflow tract velocity time integral of 26.25 cm, and a peak instantaneous transvalvular velocity time integral of 31.0 cm, the calculated aortic valve area is 3.16 cm2.     Mitral Valve:  Mitral valve is normal in structure with normal leaflet mobility.     Tricuspid Valve:  Tricuspid valve is normal in structure with normal leaflet mobility.     Pulmonary Valve:  Pulmonary valve is normal in structure with normal leaflet mobility.     IVC: IVC is normal in size and collapses > 50% with a sniff, suggesting normal right atrial pressure of 3 mmHg.     Atrial Septum: The atrial septum is intact.      Intracavitary: There is no evidence of pericardial effusion, intracavity mass, thrombi, or vegetation.         CONCLUSIONS     1 - Moderate left atrial enlargement.     2 - Concentric remodeling.     3 - Normal left ventricular systolic function (EF 60-65%).     4 - Normal left ventricular diastolic function.     5 - Normal right ventricular systolic function .         This document has been electronically    SIGNED BY: Rachid Romero MD On: 01/22/2015 18:29    and Transthoracic echo (TTE) complete (Cupid Only):   Results for orders placed or performed during the hospital encounter of 03/05/20   Echo Color Flow Doppler? Yes   Result Value Ref Range    Ascending aorta 2.74 cm    STJ 2.96 cm    AV mean gradient 4 mmHg    Ao peak jian 1.40 m/s    Ao VTI 37.43 cm    IVRT 105.61 msec    IVS 1.07 0.6 - 1.1 cm    LA size 2.99 cm    Left Atrium Major Axis 4.75 cm    Left Atrium Minor Axis 2.85 cm    LVIDD 4.03 3.5 - 6.0 cm    LVIDS 2.88 2.1 - 4.0 cm    LVOT diameter 2.12 cm    LVOT peak VTI 33.35 cm    PW 1.24 (A) 0.6 - 1.1 cm    MV Peak A Jian 0.79 m/s    E wave decelartion time 304.23 msec    MV Peak E Jian 0.75 m/s    PV Peak D Jian 0.48 m/s    PV Peak S Jian 0.58 m/s    RA Major Axis 4.31 cm    Sinus 3.10 cm    TAPSE 2.62 cm    TR Max Jian 2.80 m/s    TDI LATERAL 0.09 m/s    TDI SEPTAL 0.10 m/s    LA WIDTH 4.01 cm    Ao root annulus 3.40 cm    LV Diastolic Volume 71.05 mL    LV Systolic Volume 31.65 mL    LVOT peak jian 1.17 m/s    LV LATERAL E/E' RATIO 8.33 m/s    LV SEPTAL E/E' RATIO 7.50 m/s    FS 29 %    LA volume 36.31 cm3    LV mass 158.17 g    Left Ventricle Relative Wall Thickness 0.62 cm    AV valve area 3.14 cm2    AV Velocity Ratio 0.84     AV index (prosthetic) 0.89     E/A ratio 0.95     Mean e' 0.10 m/s    Pulm vein S/D ratio 1.21     LVOT area 3.5 cm2    LVOT stroke volume 117.66 cm3    AV peak gradient 8 mmHg    E/E' ratio 7.89 m/s    Triscuspid Valve Regurgitation Peak Gradient 31 mmHg    BSA 1.98 m2    LV  Systolic Volume Index 16.5 mL/m2    LV Diastolic Volume Index 37.14 mL/m2    LA Volume Index 19.0 mL/m2    LV Mass Index 83 g/m2    and X-Ray: CXR: X-Ray Chest 1 View (CXR): No results found for this visit on 03/05/20. and X-Ray Chest PA and Lateral (CXR): No results found for this visit on 03/05/20.    Assessment and Plan:     * Troponin level elevated  -Troponin chronically elevated, trending downward  -OK to stop IV heparin gtt  -ECHO pending  -MPI stress test today  -Further recs to follow.   -Will benefit from OP sleep study to rule out sleep apnea    HTN (hypertension)  -ON MEDICAL THERAPY  -continue ACEI  -avoid BB given bradycardia issues        VTE Risk Mitigation (From admission, onward)         Ordered     IP VTE HIGH RISK PATIENT  Once      03/05/20 9589                Thank you for your consult. I will follow-up with patient. Please contact us if you have any additional questions.    RAVI Peña-YUVAL  Cardiology   [facility]

## 2020-03-06 NOTE — PLAN OF CARE
Pt AAOX4. VSS indicate sinus bradycardia.  Heparin gtt in progress. Explained to patient the process of obtaining a therapeutic level. Verbalized understanding.  Pt able to ambulate to and from bathroom with standby assist. Able to reposition herself independently in bed.  Bed in low position with side rails up X2, wheels locked, call light within easy access.  Instructed to call for assistance.   Hourly rounding completed.  Will continue to monitor.

## 2020-03-06 NOTE — SIGNIFICANT EVENT
Pt seen and examined. Currently chest pain free. Cardiology following. Stress test pending. Heparin gtt discontinued per Dr. Saab. Full note to follow.

## 2020-03-06 NOTE — SUBJECTIVE & OBJECTIVE
Past Medical History:   Diagnosis Date    Coronary atherosclerosis 2019    Depression     GERD (gastroesophageal reflux disease)     High cholesterol     Hyperlipidemia     Hypertension     Hypothyroidism     Metabolic syndrome X 2019    MI (myocardial infarction)     Stroke     Vitamin D deficiency 2019       Past Surgical History:   Procedure Laterality Date    ANGIOPLASTY       SECTION      x 2    HYSTERECTOMY      OOPHORECTOMY         Review of patient's allergies indicates:  No Known Allergies    No current facility-administered medications on file prior to encounter.      Current Outpatient Medications on File Prior to Encounter   Medication Sig    aspirin 325 MG tablet Take 325 mg by mouth once daily.    atorvastatin (LIPITOR) 40 MG tablet atorvastatin 40 mg tablet   Take 1 tablet every day by oral route for 30 days.    CALCIUM CARBONATE/VITAMIN D3 (VITAMIN D-3 ORAL) Take by mouth.    citalopram (CELEXA) 20 MG tablet Take 20 mg by mouth once daily.    lisinopril 10 MG tablet Take 10 mg by mouth once daily.    omeprazole (PRILOSEC) 40 MG capsule Take 40 mg by mouth once daily.    amLODIPine (NORVASC) 5 MG tablet Take 5 mg by mouth once daily.    cyanocobalamin, vitamin B-12, 50 mcg tablet Take by mouth.    lactulose (CEPHULAC) 20 gram Pack Take 1 packet (20 g total) by mouth 3 (three) times daily.    levothyroxine (SYNTHROID) 25 MCG tablet Take 25 mcg by mouth once daily.    potassium chloride (KLOR-CON) 10 MEQ TbSR potassium chloride ER 10 mEq tablet,extended release   Take 1 tablet every day by oral route for 30 days.     Family History     None        Tobacco Use    Smoking status: Never Smoker    Smokeless tobacco: Never Used   Substance and Sexual Activity    Alcohol use: Yes     Comment: occasionally    Drug use: No    Sexual activity: Yes     Partners: Male     Review of Systems   Constitutional: Negative for activity change, appetite change, chills,  diaphoresis, fatigue, fever and unexpected weight change.   HENT: Negative for congestion, drooling, facial swelling, rhinorrhea, sinus pressure, sneezing and sore throat.         Neck pain    Eyes: Negative for discharge, redness, itching and visual disturbance.   Respiratory: Positive for shortness of breath. Negative for apnea, cough, choking, chest tightness, wheezing and stridor.    Cardiovascular: Positive for chest pain. Negative for palpitations and leg swelling.   Gastrointestinal: Negative for abdominal distention, abdominal pain, anal bleeding, blood in stool, constipation, diarrhea, nausea and vomiting.   Genitourinary: Negative for decreased urine volume, difficulty urinating, dysuria, frequency, hematuria, pelvic pain, urgency, vaginal bleeding and vaginal discharge.   Musculoskeletal: Negative for arthralgias, back pain, gait problem, joint swelling, myalgias, neck pain and neck stiffness.   Skin: Negative for color change, pallor, rash and wound.   Neurological: Positive for headaches. Negative for dizziness, seizures, facial asymmetry, speech difficulty, weakness, light-headedness and numbness.   Psychiatric/Behavioral: Negative for agitation, confusion, hallucinations and suicidal ideas.   All other systems reviewed and are negative.    Objective:     Vital Signs (Most Recent):  Temp: 97.5 °F (36.4 °C) (03/05/20 1925)  Pulse: (!) 46 (03/05/20 1925)  Resp: 16 (03/05/20 1925)  BP: (!) 123/58 (03/05/20 1925)  SpO2: 99 % (03/05/20 1925) Vital Signs (24h Range):  Temp:  [97 °F (36.1 °C)-97.5 °F (36.4 °C)] 97.5 °F (36.4 °C)  Pulse:  [42-56] 46  Resp:  [16-25] 16  SpO2:  [96 %-100 %] 99 %  BP: (108-156)/(53-89) 123/58     Weight: 85.6 kg (188 lb 11.4 oz)  Body mass index is 33.43 kg/m².    Physical Exam   Constitutional: She is oriented to person, place, and time. She appears well-developed and well-nourished.   Obese   HENT:   Head: Normocephalic and atraumatic.   Eyes: Pupils are equal, round, and  reactive to light. Conjunctivae and EOM are normal.   Neck: Normal range of motion. Neck supple.   Cardiovascular: Regular rhythm, normal heart sounds and intact distal pulses.   No murmur heard.  Bradycardic   Pulmonary/Chest: Effort normal and breath sounds normal. No respiratory distress.   Abdominal: Soft. Bowel sounds are normal. She exhibits no distension. There is no tenderness.   Musculoskeletal: Normal range of motion. She exhibits no edema, tenderness or deformity.   Neurological: She is alert and oriented to person, place, and time. She has normal reflexes.   Skin: Skin is warm and dry. No erythema.   Psychiatric: She has a normal mood and affect. Her behavior is normal.   Nursing note and vitals reviewed.        CRANIAL NERVES     CN III, IV, VI   Pupils are equal, round, and reactive to light.  Extraocular motions are normal.        Significant Labs: All pertinent labs within the past 24 hours have been reviewed.    Significant Imaging:   Imaging Results          CTA Chest Abdomen Non Coronary (Final result)  Result time 03/05/20 10:03:40    Final result by Pierre Tomas MD (03/05/20 10:03:40)                 Impression:      Cardiomegaly.  No evidence of aortic dissection.    Moderate constipation.    Mild atelectasis or infiltrate left lower lobe.    All CT scans at this facility use dose modulation, iterative reconstruction and/or weight based dosing when appropriate to reduce radiation dose to as low as reasonably achievable.      Electronically signed by: Pierre Tomas MD  Date:    03/05/2020  Time:    10:03             Narrative:    EXAMINATION:  CTA CHEST ABDOMEN NON CORONARY (XPD)    CLINICAL HISTORY:  Aortic dz, non-traumatic, known or suspect;    TECHNIQUE:  The patient was surveyed from the thoracic inlet through the upper pelvis after the administration of 100 cc Omni 350 IV contrast using CTA angiography technique and data was reconstructed for coronal, sagittal, and axial  images.    COMPARISON:  10/10/2019    FINDINGS:  The soft tissues at the base of the neck are unremarkable.  The thyroid gland is normal in size and configuration.  There is no abnormal lymph node enlargement.    There is no axillary, mediastinal, or hilar lymphadenopathy.  Shotty axillary adenopathy is noted.  The hilar contours are unremarkable.  The esophagus is normal in course and contour.    The thoracic aorta is normal in course, caliber, and contour without significant atherosclerotic calcifications.Tortuosity of the descending aorta can be seen with chronic hypertension.    Cardiomegaly is noted.  No significant coronary calcifications.  No pericardial effusion.    The trachea and proximal airways are patent.    Decreased lung volumes are noted.  Bilateral dependent changes.  Mild atelectasis or infiltrate left lower lobe.    The liver is normal in size and attenuation with no focal hepatic abnormality.  The gallbladder shows no evidence of stones or pericholecystic fluid.  There is no intra-or extrahepatic biliary ductal dilatation.    The stomach, spleen, pancreas, and adrenal glands are unremarkable.    The kidneys are normal in size and location.  Small benign cyst right lower pole.  There is no evidence of hydronephrosis.  The ureters appear normal in course and caliber without evidence of ureteral dilatation. The urinary bladder demonstrates no significant abnormality.    The abdominal aorta is normal in course and caliber without significant atherosclerotic calcifications.  Mild narrowing at the origin of the right renal artery.  Mesenteric arteries are mildly patent.    Small hiatal hernia the visualized loops of small bowel show no evidence of obstruction or inflammation. Large bowel is unremarkable with moderate constipation.  There is no ascites, free fluid, or intraperitoneal free air. There is no evidence of lymph node enlargement in the abdomen or pelvis.    Small fat containing umbilical  hernia.    When viewed with bone windows the osseous structures are unremarkable.  The extraperitoneal soft tissues are unremarkable.                               CT Head Without Contrast (Final result)  Result time 03/05/20 08:25:40    Final result by Pierre Tomas MD (03/05/20 08:25:40)                 Impression:      Mild chronic microvascular ischemic changes.    All CT scans at this facility use dose modulation, iterative reconstruction, and/or weight based dosing when appropriate to reduce radiation dose to as low as reasonable achievable.      Electronically signed by: Pierre Tomas MD  Date:    03/05/2020  Time:    08:25             Narrative:    EXAMINATION:  CT HEAD WITHOUT CONTRAST    CLINICAL HISTORY:  Focal neuro deficit, new, fixed or worsening, <6 hours;    TECHNIQUE:  Low dose axial CT images obtained throughout the head without intravenous contrast. Sagittal and coronal reconstructions were performed.    COMPARISON:  None.    FINDINGS:  Intracranial compartment:    The brain parenchyma demonstrates areas of decreased attenuation with mild periventricular white matter consistent with chronic microvascular ischemic changes.  No parenchymal mass, hemorrhage, edema or major vascular distribution infarct.  Vascular calcifications are noted.    Mild prominence of the sulci and ventricles are consistent with age-related involutional changes.    No extra-axial blood or fluid collections.    Skull/extracranial contents (limited evaluation): No fracture. Mastoid air cells and paranasal sinuses are essentially clear.                               X-Ray Chest AP Portable (Final result)  Result time 03/05/20 08:20:26    Final result by Gomez Rodriguez MD (03/05/20 08:20:26)                 Impression:      No acute abnormality.      Electronically signed by: Gomez Rodriguez  Date:    03/05/2020  Time:    08:20             Narrative:    EXAMINATION:  XR CHEST AP PORTABLE    CLINICAL HISTORY:  Chest  pain;.    TECHNIQUE:  Single frontal portable view of the chest was performed.    COMPARISON:  10/10/2019    FINDINGS:  Support devices: None    Unchanged elevation of the left hemidiaphragm.  Posterior lung bases are not well visualized due to AP technique.  Pulmonary vascular congestion without rupinder pulmonary edema.  No pleural effusion or pneumothorax.    Unchanged mild cardiomegaly..  The hilar and mediastinal contours are unremarkable.    Bones are intact.

## 2020-03-06 NOTE — HOSPITAL COURSE
3/6/2020-Patient placed in observation, BP well controlled o/n. ECHO pending. Plan for MPI stress test today.

## 2020-03-06 NOTE — HPI
Scooter Ochoa is a 55 yo female with a PMH of hypertension, hyperlipidemia, previous MI and previous CVA who presented to [facility] ER with complaints of substernal chest pain that radiates to her right arm and neck. Associated symptoms include shortness of breath and headache. The patient denies any modifying factors. Patient denies fever, chills, cough, pnd, orthopnea, palpitations, diaphoresis, blurred vision, numbness, tingling, dizziness, localized weakness, n/v/d, abdominal pain, blood in stools, melena, hematemesis, urinary frequency, urgency, dysuria or hematuria. In the ER the patient was found to have an elevated troponin of 0.138. The case was discussed with Cardiology who recommended a heparin gtt and trending troponins. The patient is being placed in observation to rule out ACS.

## 2020-03-06 NOTE — ASSESSMENT & PLAN NOTE
-Troponin chronically elevated, trending downward  -OK to stop IV heparin gtt  -ECHO pending  -MPI stress test today  -Further recs to follow.   -Will benefit from OP sleep study to rule out sleep apnea

## 2020-03-20 ENCOUNTER — OFFICE VISIT (OUTPATIENT)
Dept: PULMONOLOGY | Facility: CLINIC | Age: 55
End: 2020-03-20
Payer: COMMERCIAL

## 2020-03-20 ENCOUNTER — LAB VISIT (OUTPATIENT)
Dept: LAB | Facility: HOSPITAL | Age: 55
End: 2020-03-20
Attending: INTERNAL MEDICINE
Payer: COMMERCIAL

## 2020-03-20 VITALS
WEIGHT: 190.13 LBS | HEART RATE: 46 BPM | HEIGHT: 63 IN | OXYGEN SATURATION: 98 % | BODY MASS INDEX: 33.69 KG/M2 | TEMPERATURE: 98 F | RESPIRATION RATE: 20 BRPM | SYSTOLIC BLOOD PRESSURE: 146 MMHG | DIASTOLIC BLOOD PRESSURE: 84 MMHG

## 2020-03-20 DIAGNOSIS — R79.89 TROPONIN LEVEL ELEVATED: Chronic | ICD-10-CM

## 2020-03-20 DIAGNOSIS — G47.33 OSA (OBSTRUCTIVE SLEEP APNEA): Primary | ICD-10-CM

## 2020-03-20 PROCEDURE — 99999 PR PBB SHADOW E&M-EST. PATIENT-LVL III: CPT | Mod: PBBFAC,,, | Performed by: INTERNAL MEDICINE

## 2020-03-20 PROCEDURE — 87798 DETECT AGENT NOS DNA AMP: CPT | Mod: 59

## 2020-03-20 PROCEDURE — 99999 PR PBB SHADOW E&M-EST. PATIENT-LVL III: ICD-10-PCS | Mod: PBBFAC,,, | Performed by: INTERNAL MEDICINE

## 2020-03-20 PROCEDURE — 86060 ANTISTREPTOLYSIN O TITER: CPT

## 2020-03-20 PROCEDURE — 3008F BODY MASS INDEX DOCD: CPT | Mod: CPTII,S$GLB,, | Performed by: INTERNAL MEDICINE

## 2020-03-20 PROCEDURE — 3077F PR MOST RECENT SYSTOLIC BLOOD PRESSURE >= 140 MM HG: ICD-10-PCS | Mod: CPTII,S$GLB,, | Performed by: INTERNAL MEDICINE

## 2020-03-20 PROCEDURE — 3079F DIAST BP 80-89 MM HG: CPT | Mod: CPTII,S$GLB,, | Performed by: INTERNAL MEDICINE

## 2020-03-20 PROCEDURE — 99204 OFFICE O/P NEW MOD 45 MIN: CPT | Mod: S$GLB,,, | Performed by: INTERNAL MEDICINE

## 2020-03-20 PROCEDURE — 3077F SYST BP >= 140 MM HG: CPT | Mod: CPTII,S$GLB,, | Performed by: INTERNAL MEDICINE

## 2020-03-20 PROCEDURE — 3008F PR BODY MASS INDEX (BMI) DOCUMENTED: ICD-10-PCS | Mod: CPTII,S$GLB,, | Performed by: INTERNAL MEDICINE

## 2020-03-20 PROCEDURE — 3079F PR MOST RECENT DIASTOLIC BLOOD PRESSURE 80-89 MM HG: ICD-10-PCS | Mod: CPTII,S$GLB,, | Performed by: INTERNAL MEDICINE

## 2020-03-20 PROCEDURE — 99204 PR OFFICE/OUTPT VISIT, NEW, LEVL IV, 45-59 MIN: ICD-10-PCS | Mod: S$GLB,,, | Performed by: INTERNAL MEDICINE

## 2020-03-20 PROCEDURE — 82164 ANGIOTENSIN I ENZYME TEST: CPT

## 2020-03-20 RX ORDER — FLUTICASONE PROPIONATE 50 MCG
SPRAY, SUSPENSION (ML) NASAL
COMMUNITY
Start: 2019-12-30

## 2020-03-20 NOTE — PATIENT INSTRUCTIONS
Monitoring Your Sleep: Home Sleep Study    A home sleep study tracks and records body functions while youre asleep in your own bed. The results of the study will help diagnose your sleep problem and plan your treatment.  How a home sleep study works  During a sleep study, sensors attached to your body measure your breathing, oxygen level, and other body functions. You will be shown how to attach the sensors to your body. You may also have help from a technician. At bedtime you plug the sensors into a small computer and turn it on. In the morning, you will remove the sensors and return the computer so the results can be studied.  Tips  Youll be given instructions for how to set up the sensors and the computer. Doing so will be simple. For best results:  · Go through the instructions during the day so youll be ready to use the equipment at bedtime.  · Stick to your normal routine. Ask your healthcare provider if you should do anything differently the night of the study.  · If you get up during the night, reconnect the sensors to the computer or to yourself correctly.  · Get as many hours of sleep as you can.  Getting the results  The results of your sleep study need to be scored and interpreted. Once this is done, your healthcare provider will discuss the findings with you. The sleep study results will show whether you have apnea. This is when your breathing stops temporarily many times during the night, awakening you briefly.  It can also tell how severe the apnea is. The findings help your healthcare provider know which treatment or treatments may be the right ones for you.  Date Last Reviewed: 8/9/2015  © 5825-0975 [employer]. [address] [place] [address], [place] [address]. All rights reserved. This information is not intended as a substitute for professional medical care. Always follow your healthcare professional's instructions.

## 2020-03-20 NOTE — PROGRESS NOTES
Subjective:      Patient ID: Scooter Ochoa is a 54 y.o. female.    Patient Active Problem List   Diagnosis    NSTEMI (non-ST elevated myocardial infarction)    HTN (hypertension)    Graves disease    Chest pain    Gastroesophageal reflux disease    Elevated troponin    Coronary atherosclerosis    Acquired atrophy of thyroid    Metabolic syndrome X    Recurrent major depression    Transient ischemic attack    Vitamin D deficiency    Troponin level elevated    Thyroid disease    GIULIA (obstructive sleep apnea)       Problem list has been reviewed.    Chief Complaint: Sleep Apnea (grandaughter witnessed pt stop breathing in sleep); Snoring; and chronic fatigue        she has been referred by Faith Russell FNP for evaluation for possible obstructive sleep apnea    HPI:   Sleep Apnea:    She presents for a sleep evaluation.  Patient has observed  apnea, tossing/turning, loud snoring.   Patient reports snoring, periods of not breathing, tossing and turning, excessive daytime sleepiness, congested nose, feels sleepy during the day and non restful' sleep. she denies decreased memory, decreased concentration, falling asleep while driving, eating    Cardiovascular risk factors: hypertension, hyperlipidemia, coronary artery disease, history of prior MI and history of TIA's.     Bed time is 2100 Wake time is 0400;Sleep onset is within  1 Hour;Sleep maintenance difficulties related to early morning awakening, frequent night time awakening, difficulty falling asleep and non-restful sleep;  Wake after sleep onset occurs three times a night;Nocturia occurs three times a night; Uses sleep aids : No  Wakes up with a dry mouth : Yes.    Sleep walking: No;Sleep talking : Yes;Sleep eating:No;Vivid Dreams : Yes;Cataplexy : No,   Hypnogogic hallucinations:  No      COMORBIDITIES:  BP Readings from Last 3 Encounters:   03/20/20 (!) 146/84   03/06/20 135/62   10/11/19 (!) 126/59           Steele Questionnaire (validated  GIULIA screening questionnaire)  Positive -- Snoring/apnea  Positive -- Fatigue    Body mass index is 33.68 kg/m².  (>25 is overweight, >30 is obese)  Blood Pressure = Hypertension    (PreHTN 120-139/80-89, Stg1 140-159/90-99, Stg2 >160/>100)  [place] = Three of three GIULIA categories are positive (high risk is 2-3 positive categories)     Candor Sleepiness Scale   EPWORTH SLEEPINESS SCALE 3/20/2020   Sitting and reading 2   Watching TV 3   Sitting, inactive in a public place (e.g. a theatre or a meeting) 0   As a passenger in a car for an hour without a break 3   Lying down to rest in the afternoon when circumstances permit 3   Sitting and talking to someone 0   Sitting quietly after a lunch without alcohol 3   In a car, while stopped for a few minutes in traffic 0   Total score 14       Reference: Chris CAST. A new method for measuring daytime sleepiness: The Candor  Sleepiness Scale. Sleep 1991; 14(6):540-5.    STOP-Bang Questionnaire (validated GIULIA screening questionnaire)  Negative unless checked off.  [x] Snoring    [x]  Tired/Fatigued/Sleepy  [x] Obstruction (apneas/choking)  [x] Pressure (HTN)  [] BMI >35  [x] Age >50  [x] Neck >40 cm  [] Gender male   STOP-Bang = 6 (low risk 0-2,high risk 3-8)    References:   STOP Questionnaire   A Tool to Screen Patients for Obstructive Sleep Apnea: LESA Escudero.R.C.P.C., DANIEL Calzada.B.B.S., Kitty Banuelos M.D.,Connie Webster, Ph.D., Iban Wilson M.B.B.S.,_ Corbin Landry.,_ Leigh Moyer M.D., Scott Baker F.R.C.P.C.; Anesthesiology 2008; 108:812-21 Copyright © 2008, the American Society of Anesthesiologists, Inc. [employer]      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    Avocational Exposures:  none    Pet Exposures:  none    Previous Report Reviewed: historical medical records and radiology reports     The following portions of the patient's history were reviewed and updated as appropriate: She  has a past medical history of Coronary atherosclerosis (2019), Depression, GERD (gastroesophageal reflux disease), High cholesterol, Hyperlipidemia, Hypertension, Hypothyroidism, Metabolic syndrome X (2019), MI (myocardial infarction), Stroke, and Vitamin D deficiency (2019).  She  has a past surgical history that includes Hysterectomy;  section; Angioplasty; and Oophorectomy.  Her family history is not on file.  She  reports that she has never smoked. She has never used smokeless tobacco. She reports that she drinks alcohol. She reports that she does not use drugs.  She has a current medication list which includes the following prescription(s): aspirin, atorvastatin, calcium carbonate/vitamin d3, citalopram, cyanocobalamin (vitamin b-12), fluticasone propionate, lactulose, levothyroxine, lisinopril, and omeprazole.  She has No Known Allergies..    Review of Systems   Constitutional: Positive for fatigue and night sweats. Negative for fever and chills.   HENT: Positive for postnasal drip, rhinorrhea, sore throat and congestion.    Eyes: Positive for itching.   Respiratory: Positive for apnea, snoring and dyspnea on extertion. Negative for cough, hemoptysis, chest tightness and wheezing.    Cardiovascular: Positive for palpitations and leg swelling. Negative for chest pain.   Genitourinary: Negative for difficulty urinating and hematuria.   Endocrine: Negative for cold intolerance and heat intolerance.    Musculoskeletal: Positive for arthralgias, back pain and myalgias.   Gastrointestinal: Positive for acid reflux. Negative for nausea, vomiting, abdominal pain and abdominal distention.        Constipation   Neurological: Positive for  "headaches. Negative for dizziness and light-headedness.   Hematological: Excessive bruising.   Psychiatric/Behavioral: Positive for sleep disturbance. The patient is nervous/anxious.       Objective:     Vitals:    03/20/20 0925   BP: (!) 146/84   Pulse: (!) 46   Resp: 20   Temp: 98.2 °F (36.8 °C)   TempSrc: Oral   SpO2: 98%   Weight: 86.3 kg (190 lb 2.4 oz)   Height: 5' 3" (1.6 m)     Body mass index is 33.68 kg/m².    Physical Exam   Constitutional: She is oriented to person, place, and time. She appears well-developed and well-nourished.   Obese   HENT:   Head: Normocephalic and atraumatic.   Mallampati 4   Eyes: Pupils are equal, round, and reactive to light. EOM are normal.   Neck: Normal range of motion. Neck supple.   14"   Cardiovascular: S1 normal. Bradycardia present.   Loud S2   Pulmonary/Chest: Effort normal and breath sounds normal. No stridor. No respiratory distress. She has no wheezes.   Abdominal: Soft. Bowel sounds are normal.   Musculoskeletal: Normal range of motion. She exhibits no edema.   Neurological: She is alert and oriented to person, place, and time.   Skin: Skin is warm and dry. Capillary refill takes less than 2 seconds.   Psychiatric: She has a normal mood and affect.   Nursing note and vitals reviewed.      Personal Diagnostic Review    CXR: 03/05/20:   Unchanged elevation of the left hemidiaphragm.  Posterior lung bases are not well visualized due to AP technique.  Pulmonary vascular congestion without rupinder pulmonary edema.  No pleural effusion or pneumothorax.    Unchanged mild cardiomegaly..  The hilar and mediastinal contours are unremarkable.    Bones are intact.    STRESS TEST, REGADENOSON W MYOCARDIAL PERFUSION SPECT (MULTI STUDY): 03/06/20:  The perfusion scan is free of evidence from myocardial ischemia or injury.  Gated perfusion images showed an ejection fraction of 65% at rest and 64% post stress.  The EKG portion of this study is negative for ischemia.    Transthoracic " echo (TTE) complete:03/06/20:    · Concentric left ventricular remodeling.  · Normal left ventricular systolic function. The estimated ejection fraction is 55%.  · Normal LV diastolic function.  · The estimated PA systolic pressure is 34 mmHg.  · No wall motion abnormalities.  · Normal right ventricular systolic function.      EKG 12-lead: 03/05/20:    Sinus bradycardia  Low voltage QRS  T wave abnormality, consider lateral ischemia  Abnormal ECG  When compared with ECG of 10-OCT-2019 21:12,No significant change was found    Assessment /plan       Discussed diagnosis, its evaluation, treatment and usual course. All questions answered.    Problem List Items Addressed This Visit        Cardiac/Vascular    Troponin level elevated    Current Assessment & Plan     Chronically elevated troponin / Sinus bradycardia. Etiology unclear. Normal Stress NUC; Normal LHC.    Check Borellia DNA probe  Check ASO titre  Check ACE levels.          Relevant Orders    LYME DISEASE DNA PROBE, DIRECT    STREPTOCOCCAL ANTIBODIES (ASO)    Angiotensin converting enzyme       Other    GIULIA (obstructive sleep apnea) - Primary    Current Assessment & Plan     My recommendation at this point would be to set up a [facility]  We have discussed weight loss and how this may improve her situation.  We have discussed behavioral modifications, as well.  After her study, she  could certainly try a CPAP.          Relevant Orders    Home Sleep Studies          TIME SPENT WITH PATIENT: Time spent: 50 minutes in face to face  discussion concerning diagnosis, prognosis, review of lab and test results, benefits of treatment as well as management of disease, counseling of patient and coordination of care between various health  care providers . Greater than half the time spent was used for coordination of care and counseling of patient.       Return after sleep study.

## 2020-03-20 NOTE — LETTER
March 20, 2020      RAVI Tellez  [address] [facility]   Team [facility] [address]           [facility]  [address] [facility] [address]  Phone: [phone]  Fax: [phone]          Patient: Scooter Ochoa   MR Number: [id number]   YOB: 1965   Date of Visit: 3/20/2020       Dear Faith Russell:    Thank you for referring Scooter Ochoa to me for evaluation. Attached you will find relevant portions of my assessment and plan of care.    If you have questions, please do not hesitate to call me. I look forward to following Scooter Ochoa along with you.    Sincerely,    Kayden Barnhart MD    Enclosure  CC:  No Recipients    If you would like to receive this communication electronically, please contact [email or web address] or [phone] to request more information on [employer] access.    For providers and/or their staff who would like to refer a patient to [facility], please contact us through our one-stop-shop provider referral line, [facility], at [phone].    If you feel you have received this communication in error or would no longer like to receive these types of communications, please e-mail [email or web address]

## 2020-03-20 NOTE — ASSESSMENT & PLAN NOTE
Chronically elevated troponin / Sinus bradycardia. Etiology unclear. Normal Stress NUC; Normal LHC.    Check Borellia DNA probe  Check ASO titre  Check ACE levels.

## 2020-03-23 ENCOUNTER — TELEPHONE (OUTPATIENT)
Dept: PULMONOLOGY | Facility: HOSPITAL | Age: 55
End: 2020-03-23

## 2020-03-23 LAB
ACE SERPL-CCNC: 11 U/L (ref 16–85)
ASO AB SERPL-ACNC: 174 IU/ML

## 2020-03-26 LAB
B BURGDOR DNA BLD QL NAA+PROBE: NEGATIVE
B. GARINII/B. AFZELII PCR, BLOOD: NEGATIVE
B. MAYONII PCR, BLOOD: NEGATIVE
MICROBIOLOGIST REVIEW: NORMAL

## 2020-03-27 ENCOUNTER — PROCEDURE VISIT (OUTPATIENT)
Dept: SLEEP MEDICINE | Facility: CLINIC | Age: 55
End: 2020-03-27
Payer: COMMERCIAL

## 2020-03-27 DIAGNOSIS — G47.33 OSA (OBSTRUCTIVE SLEEP APNEA): Primary | ICD-10-CM

## 2020-03-27 PROCEDURE — 99499 NO LOS: ICD-10-PCS | Mod: S$GLB,,, | Performed by: INTERNAL MEDICINE

## 2020-03-27 PROCEDURE — 95806 PR SLEEP STUDY, UNATTENDED, SIMUL RECORD HR/O2 SAT/RESP FLOW/RESP EFFT: ICD-10-PCS | Mod: 26,52,S$GLB, | Performed by: INTERNAL MEDICINE

## 2020-03-27 PROCEDURE — 99499 UNLISTED E&M SERVICE: CPT | Mod: S$GLB,,, | Performed by: INTERNAL MEDICINE

## 2020-03-27 PROCEDURE — 95806 SLEEP STUDY UNATT&RESP EFFT: CPT | Mod: 26,52,S$GLB, | Performed by: INTERNAL MEDICINE

## 2020-03-27 NOTE — PROCEDURES
Home Sleep Studies  Date/Time: 3/27/2020 8:00 AM  Performed by: Can Cameron MD  Authorized by: aKyden Barnhart MD       Assessment and Recommendations  1 night study  MILD OBSTRUCTIVE SLEEP APNEA with overall AHI 9.9/hr ( 41 events)  Oxygen desaturation: 89%. SpO2 between 85% to 89% for < 1 min.  Patient snored 79% time above 50 .  Heart rate range: 46 bpm -63 bpm  REC's:  Initiate APAP at 4-20 cm WP using mask of choice with heated humidification.  INLAB CPAP titration may also be option.  Other modalites used for treatment of mild obstructive sleep apnea may be considered.  Weight loss/management. with regular exercise per direction of physician.  Avoid drowsy driving.  Follow up in sleep clinic to maximize adherence and ensure resolution of symptoms

## 2020-03-27 NOTE — Clinical Note
Assessment and Recommendations1 night studyMILD OBSTRUCTIVE SLEEP APNEA with overall AHI 9.9/hr ( 41 events)Oxygen desaturation: 89%. SpO2 between 85% to 89% for < 1 min.Patient snored 79% time above 50 .Heart rate range: 46 bpm -63 bpmREC's:Initiate APAP at 4-20 cm WP using mask of choice with heated humidification.INLAB CPAP titration may also be option.Other modalites used for treatment of mild obstructive sleep apnea may be considered.Weight loss/management. with regular exercise per direction of physician.Avoid drowsy driving.Follow up in sleep clinic to maximize adherence and ensure resolution of symptoms

## 2020-03-30 ENCOUNTER — TELEPHONE (OUTPATIENT)
Dept: PULMONOLOGY | Facility: CLINIC | Age: 55
End: 2020-03-30

## 2020-03-30 ENCOUNTER — TELEPHONE (OUTPATIENT)
Dept: PULMONOLOGY | Facility: HOSPITAL | Age: 55
End: 2020-03-30

## 2020-03-30 DIAGNOSIS — G47.33 OSA (OBSTRUCTIVE SLEEP APNEA): Primary | ICD-10-CM

## 2020-03-30 NOTE — TELEPHONE ENCOUNTER
Home sleep study Results reviewed:     1 night study  MILD OBSTRUCTIVE SLEEP APNEA with overall AHI 9.9/hr ( 41 events)  Oxygen desaturation: 89%. SpO2 between 85% to 89% for < 1 min.  Patient snored 79% time above 50 .  Heart rate range: 46 bpm -63 bpm    Assessment:     MILD GIULIA    Plan:  Start   AUTOPAP of 5 - 20 CMWP.  Schedule  8 weeks follow up for complaince evaluation.     Ordered Please inform pateint

## 2020-03-31 ENCOUNTER — TELEPHONE (OUTPATIENT)
Dept: PULMONOLOGY | Facility: CLINIC | Age: 55
End: 2020-03-31

## 2020-03-31 DIAGNOSIS — G47.33 OSA (OBSTRUCTIVE SLEEP APNEA): Primary | ICD-10-CM

## 2020-03-31 NOTE — TELEPHONE ENCOUNTER
Conveyed results of  Home sleep study appointment scheduled for 1st download on 6/19/20 All questions answered. Patient verbalized understanding

## 2020-06-19 ENCOUNTER — OFFICE VISIT (OUTPATIENT)
Dept: PULMONOLOGY | Facility: CLINIC | Age: 55
End: 2020-06-19
Payer: COMMERCIAL

## 2020-06-19 VITALS
HEIGHT: 63 IN | HEART RATE: 62 BPM | DIASTOLIC BLOOD PRESSURE: 66 MMHG | WEIGHT: 191.5 LBS | OXYGEN SATURATION: 96 % | RESPIRATION RATE: 18 BRPM | BODY MASS INDEX: 33.93 KG/M2 | SYSTOLIC BLOOD PRESSURE: 114 MMHG

## 2020-06-19 DIAGNOSIS — G47.33 OSA (OBSTRUCTIVE SLEEP APNEA): Primary | Chronic | ICD-10-CM

## 2020-06-19 PROCEDURE — 99999 PR PBB SHADOW E&M-EST. PATIENT-LVL IV: ICD-10-PCS | Mod: PBBFAC,,, | Performed by: INTERNAL MEDICINE

## 2020-06-19 PROCEDURE — 3008F PR BODY MASS INDEX (BMI) DOCUMENTED: ICD-10-PCS | Mod: CPTII,S$GLB,, | Performed by: INTERNAL MEDICINE

## 2020-06-19 PROCEDURE — 99213 PR OFFICE/OUTPT VISIT, EST, LEVL III, 20-29 MIN: ICD-10-PCS | Mod: S$GLB,,, | Performed by: INTERNAL MEDICINE

## 2020-06-19 PROCEDURE — 99999 PR PBB SHADOW E&M-EST. PATIENT-LVL IV: CPT | Mod: PBBFAC,,, | Performed by: INTERNAL MEDICINE

## 2020-06-19 PROCEDURE — 3074F PR MOST RECENT SYSTOLIC BLOOD PRESSURE < 130 MM HG: ICD-10-PCS | Mod: CPTII,S$GLB,, | Performed by: INTERNAL MEDICINE

## 2020-06-19 PROCEDURE — 3074F SYST BP LT 130 MM HG: CPT | Mod: CPTII,S$GLB,, | Performed by: INTERNAL MEDICINE

## 2020-06-19 PROCEDURE — 99213 OFFICE O/P EST LOW 20 MIN: CPT | Mod: S$GLB,,, | Performed by: INTERNAL MEDICINE

## 2020-06-19 PROCEDURE — 3008F BODY MASS INDEX DOCD: CPT | Mod: CPTII,S$GLB,, | Performed by: INTERNAL MEDICINE

## 2020-06-19 PROCEDURE — 3078F DIAST BP <80 MM HG: CPT | Mod: CPTII,S$GLB,, | Performed by: INTERNAL MEDICINE

## 2020-06-19 PROCEDURE — 3078F PR MOST RECENT DIASTOLIC BLOOD PRESSURE < 80 MM HG: ICD-10-PCS | Mod: CPTII,S$GLB,, | Performed by: INTERNAL MEDICINE

## 2020-06-19 RX ORDER — CLOPIDOGREL BISULFATE 75 MG/1
TABLET ORAL
COMMUNITY

## 2020-06-19 RX ORDER — ERGOCALCIFEROL 1.25 MG/1
CAPSULE ORAL
COMMUNITY

## 2020-06-19 RX ORDER — ISOSORBIDE MONONITRATE 30 MG/1
TABLET, EXTENDED RELEASE ORAL
COMMUNITY

## 2020-06-19 NOTE — PATIENT INSTRUCTIONS
Obstructive Sleep Apnea  Obstructive sleep apnea is a condition that causes your air passages to become narrowed or blocked during sleep. As a result, breathing stops for short periods. Your body wakes up enough for breathing to begin again, though you don't remember it. The cycle of stopped breathing and brief awakenings can repeat dozens of times a night. This prevents the body from getting to the deeper stages of sleep that are needed for good rest and may cause your body's oxygen level to fall.  Signs of sleep apnea include loud snoring, noisy breathing, and gasping sounds during sleep. Daytime symptoms include waking up tired after a full night's sleep, waking up with headaches, feeling very sleepy or falling asleep during the day, and having problems with memory or concentration.  Risk factors for sleep apnea include:  · Being overweight  · Being a man, or a woman in menopause  · Smoking  · Using alcohol or sedating medicines  · Having enlarged structures in the nose or throat  Home care  Lifestyle changes that can help treat snoring and sleep apnea include the following:  · If you are overweight, lose weight. Talk to your healthcare provider about a weight-loss plan for you.  · Avoid alcohol for 3 to 4 hours before bedtime. Avoid sedating medications. Ask your healthcare provider about the medicines you take.  · If you smoke, talk to your healthcare provider about ways to quit.  · Sleep on your side. This can help prevent gravity from pulling relaxed throat tissues into your breathing passages.  · If you have allergies or sinus problems that block your nose, ask your healthcare provider for help.  Follow-up care  Follow up with your healthcare provider, or as advised. A diagnosis of sleep apnea is made with a sleep study. Your healthcare provider can tell you more about this test.  When to seek medical advice  Sleep apnea can make you more likely to have certain health problems. These include high blood  pressure, heart attack, stroke, and sexual dysfunction. If you have sleep apnea, talk to your healthcare provider about the best treatments for you.  Date Last Reviewed: 4/1/2017  © 9258-7655 [employer]. [address] [place] [address], [place] [address]. All rights reserved. This information is not intended as a substitute for professional medical care. Always follow your healthcare professional's instructions.

## 2020-06-19 NOTE — ASSESSMENT & PLAN NOTE
Continue APAP of 5 - 20 CMWP. ([facility])     Discussed therapeutic goals for positive airway pressure therapy(CPAP or BiPAP): Ideal is usage 100% of nights for 6 - 8 hours per night. Minimum usage is 70% of night for at least 4 hours per night used. Pateint expressed understanding. All Questions answered.    Complaince download in 6 weeks.

## 2020-06-19 NOTE — PROGRESS NOTES
Subjective:      Patient ID: Scooter Ochoa is a 54 y.o. female.    Patient Active Problem List   Diagnosis    NSTEMI (non-ST elevated myocardial infarction)    HTN (hypertension)    Graves disease    Chest pain    Gastroesophageal reflux disease    Elevated troponin    Coronary atherosclerosis    Acquired atrophy of thyroid    Metabolic syndrome X    Recurrent major depression    Transient ischemic attack    Vitamin D deficiency    Troponin level elevated    Thyroid disease    GIULIA (obstructive sleep apnea)     Problem list has been reviewed.      Chief Complaint: Sleep Apnea         HPI: Follow up for GIULIA and CPAP complaince assessment.   she  is on APAP  of 5 - 20 CMWP .     She definitely thinks PAP is beneficial to her health and She wants to continue with PAP therapy.    Patient denies snoring, headaches, excessive daytime sleepiness      Compliance Summary  5/18/2020 - 6/16/2020 (30 days)   Days with Device Usage 27 days  Days without Device Usage 3 days  Percent Days with Device Usage 90.0%  Cumulative Usage 8 days 1 hrs. 30 mins. 8 secs.  Maximum Usage (1 Day) 9 hrs. 58 mins. 26 secs.  Average Usage (All Days) 6 hrs. 27 mins.  Average Usage (Days Used) 7 hrs. 10 mins.  Minimum Usage (1 Day) 29 mins. 9 secs.  Percent of Days with Usage >= 4 Hours 83.3%  Percent of Days with Usage < 4 Hours 16.7%  Date Range  Total Blower Time 8 days 1 hrs. 56 mins. 11 secs.  Average AHI 3.5  Auto-CPAP Summary  Auto-CPAP Mean Pressure 6.8 cmH2O  Auto-CPAP Peak Average Pressure 9.3 cmH2O  Average Device Pressure <= 90% of Time 9.3 cmH2O  Average Time in Large Leak Per Day 59 mins. 18 secs.    [place] Sleepiness Scale       EPWORTH SLEEPINESS SCALE 6/19/2020 3/20/2020   Sitting and reading 1 2   Watching TV 2 3   Sitting, inactive in a public place (e.g. a theatre or a meeting) 0 0   As a passenger in a car for an hour without a break 3 3   Lying down to rest in the afternoon when circumstances permit 3 3  "  Sitting and talking to someone 0 0   Sitting quietly after a lunch without alcohol 0 3   In a car, while stopped for a few minutes in traffic 0 0   Total score 9 14       Previous Report Reviewed: office notes     The following portions of the patient's history were reviewed and updated as appropriate: allergies, current medications, past family history, past medical history, past social history, past surgical history and problem list.    Review of Systems   Constitutional: Positive for fatigue and night sweats. Negative for fever and chills.   HENT: Positive for postnasal drip, rhinorrhea, sore throat and congestion.    Eyes: Positive for itching.   Respiratory: Positive for apnea, snoring and dyspnea on extertion. Negative for cough, hemoptysis, chest tightness and wheezing.    Cardiovascular: Positive for palpitations and leg swelling. Negative for chest pain.   Genitourinary: Negative for difficulty urinating and hematuria.   Endocrine: Negative for cold intolerance and heat intolerance.    Musculoskeletal: Positive for arthralgias, back pain and myalgias.   Gastrointestinal: Positive for acid reflux. Negative for nausea, vomiting, abdominal pain and abdominal distention.        Constipation   Neurological: Positive for headaches. Negative for dizziness and light-headedness.   Hematological: Excessive bruising.   Psychiatric/Behavioral: Positive for sleep disturbance. The patient is not nervous/anxious.         Objective:     Vitals:    06/19/20 1459   BP: 114/66   Pulse: 62   Resp: 18   SpO2: 96%   Weight: 86.8 kg (191 lb 7.5 oz)   Height: 5' 3" (1.6 m)     body mass index is 33.92 kg/m².    Physical Exam  Vitals signs and nursing note reviewed.   Constitutional:       Appearance: She is well-developed.      Comments: Obese   HENT:      Head: Normocephalic and atraumatic.   Eyes:      Pupils: Pupils are equal, round, and reactive to light.   Neck:      Musculoskeletal: Normal range of motion and neck supple. " "     Comments: 14"  Cardiovascular:      Rate and Rhythm: Bradycardia present.      Heart sounds: S1 normal.      Comments: Loud S2  Pulmonary:      Effort: Pulmonary effort is normal. No respiratory distress.      Breath sounds: Normal breath sounds. No stridor. No wheezing.   Abdominal:      General: Bowel sounds are normal.      Palpations: Abdomen is soft.   Musculoskeletal: Normal range of motion.   Skin:     General: Skin is warm and dry.      Capillary Refill: Capillary refill takes less than 2 seconds.   Neurological:      Mental Status: She is alert and oriented to person, place, and time.         Personal Diagnostic Review  CPAP complaince download.     Assessment / plan     Discussed diagnosis, its evaluation, treatment and usual course. All questions answered.    Problem List Items Addressed This Visit        Other    GIULIA (obstructive sleep apnea) - Primary    Current Assessment & Plan     Continue APAP of 5 - 20 CMWP. ([facility])     Discussed therapeutic goals for positive airway pressure therapy(CPAP or BiPAP): Ideal is usage 100% of nights for 6 - 8 hours per night. Minimum usage is 70% of night for at least 4 hours per night used. Pateint expressed understanding. All Questions answered.    Complaince download in 6 weeks.                  TIME SPENT WITH PATIENT: Time spent: 25 minutes in face to face  discussion concerning diagnosis, prognosis, review of lab and test results, benefits of treatment as well as management of disease, counseling of patient and coordination of care between various health  care providers . Greater than half the time spent was used for coordination of care and counseling of patient.       Follow up in 6 weeks    Review CPAP COMPLAINCE EVALUATION in  6 weeks   "

## 2021-03-01 ENCOUNTER — HOSPITAL ENCOUNTER (OUTPATIENT)
Dept: RADIOLOGY | Facility: HOSPITAL | Age: 56
Discharge: HOME | End: 2021-03-01
Attending: INTERNAL MEDICINE
Payer: COMMERCIAL

## 2021-03-01 VITALS — HEIGHT: 63 IN | WEIGHT: 191.38 LBS | BODY MASS INDEX: 33.91 KG/M2

## 2021-03-01 DIAGNOSIS — M25.551 BILATERAL HIP PAIN: Primary | ICD-10-CM

## 2021-03-01 DIAGNOSIS — M25.551 BILATERAL HIP PAIN: ICD-10-CM

## 2021-03-01 DIAGNOSIS — Z12.31 SCREENING MAMMOGRAM FOR HIGH-RISK PATIENT: Primary | ICD-10-CM

## 2021-03-01 DIAGNOSIS — M25.552 BILATERAL HIP PAIN: ICD-10-CM

## 2021-03-01 DIAGNOSIS — Z12.31 SCREENING MAMMOGRAM FOR HIGH-RISK PATIENT: ICD-10-CM

## 2021-03-01 DIAGNOSIS — M25.552 BILATERAL HIP PAIN: Primary | ICD-10-CM

## 2021-03-01 PROCEDURE — 77067 SCR MAMMO BI INCL CAD: CPT | Mod: TC,PO

## 2021-03-01 PROCEDURE — 73521 X-RAY EXAM HIPS BI 2 VIEWS: CPT | Mod: 26,,, | Performed by: RADIOLOGY

## 2021-03-01 PROCEDURE — 77063 BREAST TOMOSYNTHESIS BI: CPT | Mod: 26,,, | Performed by: RADIOLOGY

## 2021-03-01 PROCEDURE — 77063 MAMMO DIGITAL SCREENING BILAT WITH TOMO: ICD-10-PCS | Mod: 26,,, | Performed by: RADIOLOGY

## 2021-03-01 PROCEDURE — 77067 MAMMO DIGITAL SCREENING BILAT WITH TOMO: ICD-10-PCS | Mod: 26,,, | Performed by: RADIOLOGY

## 2021-03-01 PROCEDURE — 77067 SCR MAMMO BI INCL CAD: CPT | Mod: 26,,, | Performed by: RADIOLOGY

## 2021-03-01 PROCEDURE — 73521 X-RAY EXAM HIPS BI 2 VIEWS: CPT | Mod: TC,PO

## 2021-03-01 PROCEDURE — 73521 XR HIPS BILATERAL 2 VIEW INCL AP PELVIS: ICD-10-PCS | Mod: 26,,, | Performed by: RADIOLOGY

## 2021-06-18 ENCOUNTER — HOSPITAL ENCOUNTER (EMERGENCY)
Facility: HOSPITAL | Age: 56
Discharge: HOME | End: 2021-06-18
Attending: EMERGENCY MEDICINE
Payer: MEDICAID

## 2021-06-18 VITALS
OXYGEN SATURATION: 97 % | WEIGHT: 196.63 LBS | HEIGHT: 63 IN | BODY MASS INDEX: 34.84 KG/M2 | HEART RATE: 55 BPM | SYSTOLIC BLOOD PRESSURE: 129 MMHG | TEMPERATURE: 98 F | RESPIRATION RATE: 20 BRPM | DIASTOLIC BLOOD PRESSURE: 67 MMHG

## 2021-06-18 DIAGNOSIS — M79.606 LEG PAIN: Primary | ICD-10-CM

## 2021-06-18 PROCEDURE — 99284 EMERGENCY DEPT VISIT MOD MDM: CPT | Mod: 25,ER

## 2021-06-18 RX ORDER — NAPROXEN 375 MG/1
375 TABLET ORAL 2 TIMES DAILY WITH MEALS
Qty: 30 TABLET | Refills: 0 | Status: SHIPPED | OUTPATIENT
Start: 2021-06-18

## 2021-07-15 ENCOUNTER — HOSPITAL ENCOUNTER (OUTPATIENT)
Dept: RADIOLOGY | Facility: HOSPITAL | Age: 56
Discharge: HOME | End: 2021-07-15
Attending: INTERNAL MEDICINE
Payer: MEDICAID

## 2021-07-15 DIAGNOSIS — M25.512 BILATERAL SHOULDER PAIN: ICD-10-CM

## 2021-07-15 DIAGNOSIS — M25.511 BILATERAL SHOULDER PAIN: Primary | ICD-10-CM

## 2021-07-15 DIAGNOSIS — M25.512 BILATERAL SHOULDER PAIN: Primary | ICD-10-CM

## 2021-07-15 DIAGNOSIS — M54.9 DORSALGIA, UNSPECIFIED: ICD-10-CM

## 2021-07-15 DIAGNOSIS — M54.16 LUMBAR RADICULOPATHY: ICD-10-CM

## 2021-07-15 DIAGNOSIS — M25.511 BILATERAL SHOULDER PAIN: ICD-10-CM

## 2021-07-15 PROCEDURE — 72100 X-RAY EXAM L-S SPINE 2/3 VWS: CPT | Mod: 26,,, | Performed by: RADIOLOGY

## 2021-07-15 PROCEDURE — 72100 X-RAY EXAM L-S SPINE 2/3 VWS: CPT | Mod: TC,PO

## 2021-07-15 PROCEDURE — 73030 X-RAY EXAM OF SHOULDER: CPT | Mod: 26,50,, | Performed by: RADIOLOGY

## 2021-07-15 PROCEDURE — 72100 XR LUMBAR SPINE AP AND LATERAL: ICD-10-PCS | Mod: 26,,, | Performed by: RADIOLOGY

## 2021-07-15 PROCEDURE — 73030 XR SHOULDER COMPLETE 2 OR MORE VIEWS BILATERAL: ICD-10-PCS | Mod: 26,50,, | Performed by: RADIOLOGY

## 2021-07-15 PROCEDURE — 73030 X-RAY EXAM OF SHOULDER: CPT | Mod: TC,50,PO

## 2021-07-29 ENCOUNTER — IMMUNIZATION (OUTPATIENT)
Dept: PRIMARY CARE CLINIC | Facility: CLINIC | Age: 56
End: 2021-07-29
Payer: MEDICAID

## 2021-07-29 DIAGNOSIS — Z23 NEED FOR VACCINATION: Primary | ICD-10-CM

## 2021-07-29 PROCEDURE — 0011A COVID-19, MRNA, LNP-S, PF, 100 MCG/0.5 ML DOSE VACCINE: CPT | Mod: CV19,S$GLB,, | Performed by: FAMILY MEDICINE

## 2021-07-29 PROCEDURE — 0011A COVID-19, MRNA, LNP-S, PF, 100 MCG/0.5 ML DOSE VACCINE: ICD-10-PCS | Mod: CV19,S$GLB,, | Performed by: FAMILY MEDICINE

## 2021-07-29 PROCEDURE — 91301 COVID-19, MRNA, LNP-S, PF, 100 MCG/0.5 ML DOSE VACCINE: CPT | Mod: S$GLB,,, | Performed by: FAMILY MEDICINE

## 2021-07-29 PROCEDURE — 91301 COVID-19, MRNA, LNP-S, PF, 100 MCG/0.5 ML DOSE VACCINE: ICD-10-PCS | Mod: S$GLB,,, | Performed by: FAMILY MEDICINE

## 2021-08-26 ENCOUNTER — IMMUNIZATION (OUTPATIENT)
Dept: PRIMARY CARE CLINIC | Facility: CLINIC | Age: 56
End: 2021-08-26

## 2021-08-26 DIAGNOSIS — Z23 NEED FOR VACCINATION: Primary | ICD-10-CM

## 2021-08-26 PROCEDURE — 91301 COVID-19, MRNA, LNP-S, PF, 100 MCG/0.5 ML DOSE VACCINE: ICD-10-PCS | Mod: S$GLB,,, | Performed by: FAMILY MEDICINE

## 2021-08-26 PROCEDURE — 91301 COVID-19, MRNA, LNP-S, PF, 100 MCG/0.5 ML DOSE VACCINE: CPT | Mod: S$GLB,,, | Performed by: FAMILY MEDICINE

## 2021-08-26 PROCEDURE — 0012A COVID-19, MRNA, LNP-S, PF, 100 MCG/0.5 ML DOSE VACCINE: ICD-10-PCS | Mod: CV19,S$GLB,, | Performed by: FAMILY MEDICINE

## 2021-08-26 PROCEDURE — 0012A COVID-19, MRNA, LNP-S, PF, 100 MCG/0.5 ML DOSE VACCINE: CPT | Mod: CV19,S$GLB,, | Performed by: FAMILY MEDICINE

## 2023-04-03 ENCOUNTER — HOSPITAL ENCOUNTER (OUTPATIENT)
Facility: HOSPITAL | Age: 58
Discharge: OTHER INSTITUTION | End: 2023-04-03
Attending: EMERGENCY MEDICINE | Admitting: INTERNAL MEDICINE
Payer: MEDICAID

## 2023-04-03 VITALS
WEIGHT: 196.63 LBS | RESPIRATION RATE: 22 BRPM | HEART RATE: 44 BPM | BODY MASS INDEX: 34.84 KG/M2 | OXYGEN SATURATION: 98 % | SYSTOLIC BLOOD PRESSURE: 133 MMHG | TEMPERATURE: 98 F | DIASTOLIC BLOOD PRESSURE: 64 MMHG

## 2023-04-03 DIAGNOSIS — R79.89 ELEVATED TROPONIN: Primary | ICD-10-CM

## 2023-04-03 DIAGNOSIS — R07.9 CHEST PAIN: ICD-10-CM

## 2023-04-03 LAB
ALBUMIN SERPL BCP-MCNC: 3.6 G/DL (ref 3.5–5.2)
ALP SERPL-CCNC: 90 U/L (ref 55–135)
ALT SERPL W/O P-5'-P-CCNC: 12 U/L (ref 10–44)
ANION GAP SERPL CALC-SCNC: 7 MMOL/L (ref 8–16)
AST SERPL-CCNC: 13 U/L (ref 10–40)
BASOPHILS # BLD AUTO: 0.04 K/UL (ref 0–0.2)
BASOPHILS NFR BLD: 0.4 % (ref 0–1.9)
BILIRUB SERPL-MCNC: 0.4 MG/DL (ref 0.1–1)
BILIRUB UR QL STRIP: NEGATIVE
BNP SERPL-MCNC: 95 PG/ML (ref 0–99)
BUN SERPL-MCNC: 19 MG/DL (ref 6–20)
CALCIUM SERPL-MCNC: 9.2 MG/DL (ref 8.7–10.5)
CHLORIDE SERPL-SCNC: 109 MMOL/L (ref 95–110)
CK SERPL-CCNC: 120 U/L (ref 20–180)
CLARITY UR REFRACT.AUTO: CLEAR
CO2 SERPL-SCNC: 25 MMOL/L (ref 23–29)
COLOR UR AUTO: YELLOW
CREAT SERPL-MCNC: 0.9 MG/DL (ref 0.5–1.4)
DIFFERENTIAL METHOD: ABNORMAL
EOSINOPHIL # BLD AUTO: 0.2 K/UL (ref 0–0.5)
EOSINOPHIL NFR BLD: 1.9 % (ref 0–8)
ERYTHROCYTE [DISTWIDTH] IN BLOOD BY AUTOMATED COUNT: 13.9 % (ref 11.5–14.5)
EST. GFR  (NO RACE VARIABLE): >60 ML/MIN/1.73 M^2
GLUCOSE SERPL-MCNC: 93 MG/DL (ref 70–110)
GLUCOSE UR QL STRIP: NEGATIVE
HCT VFR BLD AUTO: 37.1 % (ref 37–48.5)
HGB BLD-MCNC: 11.9 G/DL (ref 12–16)
HGB UR QL STRIP: NEGATIVE
IMM GRANULOCYTES # BLD AUTO: 0.05 K/UL (ref 0–0.04)
IMM GRANULOCYTES NFR BLD AUTO: 0.5 % (ref 0–0.5)
KETONES UR QL STRIP: NEGATIVE
LEUKOCYTE ESTERASE UR QL STRIP: NEGATIVE
LYMPHOCYTES # BLD AUTO: 2 K/UL (ref 1–4.8)
LYMPHOCYTES NFR BLD: 20.6 % (ref 18–48)
MCH RBC QN AUTO: 26.5 PG (ref 27–31)
MCHC RBC AUTO-ENTMCNC: 32.1 G/DL (ref 32–36)
MCV RBC AUTO: 83 FL (ref 82–98)
MONOCYTES # BLD AUTO: 0.8 K/UL (ref 0.3–1)
MONOCYTES NFR BLD: 8.2 % (ref 4–15)
NEUTROPHILS # BLD AUTO: 6.5 K/UL (ref 1.8–7.7)
NEUTROPHILS NFR BLD: 68.4 % (ref 38–73)
NITRITE UR QL STRIP: NEGATIVE
NRBC BLD-RTO: 0 /100 WBC
PH UR STRIP: 6 [PH] (ref 5–8)
PLATELET # BLD AUTO: 255 K/UL (ref 150–450)
PMV BLD AUTO: 9.6 FL (ref 9.2–12.9)
POTASSIUM SERPL-SCNC: 3.7 MMOL/L (ref 3.5–5.1)
PROT SERPL-MCNC: 7.2 G/DL (ref 6–8.4)
PROT UR QL STRIP: NEGATIVE
RBC # BLD AUTO: 4.49 M/UL (ref 4–5.4)
SODIUM SERPL-SCNC: 141 MMOL/L (ref 136–145)
SP GR UR STRIP: 1.01 (ref 1–1.03)
TROPONIN I SERPL DL<=0.01 NG/ML-MCNC: 0.39 NG/ML (ref 0–0.03)
URN SPEC COLLECT METH UR: NORMAL
UROBILINOGEN UR STRIP-ACNC: NEGATIVE EU/DL
WBC # BLD AUTO: 9.52 K/UL (ref 3.9–12.7)

## 2023-04-03 PROCEDURE — 84484 ASSAY OF TROPONIN QUANT: CPT | Mod: ER | Performed by: EMERGENCY MEDICINE

## 2023-04-03 PROCEDURE — 93010 EKG 12-LEAD: ICD-10-PCS | Mod: ,,, | Performed by: INTERNAL MEDICINE

## 2023-04-03 PROCEDURE — 85025 COMPLETE CBC W/AUTO DIFF WBC: CPT | Mod: ER | Performed by: EMERGENCY MEDICINE

## 2023-04-03 PROCEDURE — 80053 COMPREHEN METABOLIC PANEL: CPT | Mod: ER | Performed by: EMERGENCY MEDICINE

## 2023-04-03 PROCEDURE — 93005 ELECTROCARDIOGRAM TRACING: CPT | Mod: ER

## 2023-04-03 PROCEDURE — 99285 EMERGENCY DEPT VISIT HI MDM: CPT | Mod: 25,ER

## 2023-04-03 PROCEDURE — 82550 ASSAY OF CK (CPK): CPT | Mod: ER | Performed by: EMERGENCY MEDICINE

## 2023-04-03 PROCEDURE — 81003 URINALYSIS AUTO W/O SCOPE: CPT | Mod: ER | Performed by: EMERGENCY MEDICINE

## 2023-04-03 PROCEDURE — 93010 ELECTROCARDIOGRAM REPORT: CPT | Mod: ,,, | Performed by: INTERNAL MEDICINE

## 2023-04-03 PROCEDURE — 83880 ASSAY OF NATRIURETIC PEPTIDE: CPT | Mod: ER | Performed by: EMERGENCY MEDICINE

## 2023-04-03 RX ORDER — AMLODIPINE BESYLATE 10 MG/1
10 TABLET ORAL DAILY
COMMUNITY

## 2023-04-03 RX ORDER — HEPARIN SODIUM,PORCINE/D5W 25000/250
0-40 INTRAVENOUS SOLUTION INTRAVENOUS CONTINUOUS
Status: DISCONTINUED | OUTPATIENT
Start: 2023-04-03 | End: 2023-04-03

## 2023-04-03 RX ORDER — ASPIRIN 81 MG/1
81 TABLET ORAL DAILY
COMMUNITY

## 2023-04-03 NOTE — PLAN OF CARE
[facility] ED Transfer of Care Note       Referring facility: [facility] ED  [facility]   Referring provider: KARL GILMORE, ARTURO LAWRENCE  Accepting facility: [facility]  Accepting provider: Talia Patrick DO   Admitting provider:   Reason for transfer:  Cardiac workup  Transfer diagnosis: Chest pain with elevated troponin  Transfer specialty requested: Cardiology  Cardiology  Transfer specialty notified: yes  Transfer level: Observation  Bed type requested: Standard  Isolation status: No active isolations   Admission class or status: Observation      Narrative     Currently remains in [facility] ED and unable to be examined personally. Information obtained from ED staff, medical records    Scooter Ochoa is a 57 y.o. female with CAD, prior MI, CVA, Depression, GERD, Hypertension, Hypothyroidism, who presented to [facility] ED on 4/3/2023 with constant chest pain with nausea over the past 2 weeks.  She follows with [facility], Dr. Lundberg for cardiology.  Workup notable for elevated troponin of 0.393, EKG showing bradycardia with T-wave inversion in the anterior lead.  Initially planned for transfer to [facility] where her cardiologist is at, however they are at capacity and unable to accept.  She will be placed in observation for further workup and management at [facility].  Cardiology recommended initiating heparin infusion.  We will also trend troponin.    PCP: Rosita Mcgrath MD    Objective     Vitals: Temp: 97.7 °F (36.5 °C) (04/03/23 1502)  Pulse: (!) 46 (04/03/23 1647)  Resp: (!) 23 (04/03/23 1647)  BP: 136/64 (04/03/23 1647)  SpO2: 97 % (04/03/23 1647)  Recent Labs: All pertinent labs within the past 24 hours have been reviewed.  CBC:   Recent Labs   Lab 04/03/23  1514   WBC 9.52   HGB 11.9*   HCT 37.1        CMP:   Recent Labs   Lab 04/03/23  1514      K 3.7      CO2 25   GLU 93   BUN 19   CREATININE 0.9   CALCIUM 9.2   PROT 7.2    ALBUMIN 3.6   BILITOT 0.4   ALKPHOS 90   AST 13   ALT 12   ANIONGAP 7*     Cardiac Markers:   Recent Labs   Lab 04/03/23  1514   BNP 95     Troponin:   Recent Labs   Lab 04/03/23  1514   TROPONINI 0.393*     Recent imaging:  I have reviewed all pertinent imaging results/findings within the past 24 hours.   Airway:   n/a  Vent settings:  n/a       IV access:        Peripheral IV - Single Lumen 04/03/23 1515 20 G Left;Posterior Hand (Active)   Site Assessment Clean;Dry;Intact 04/03/23 1516   Extremity Assessment Distal to IV No abnormal discoloration;No redness;No swelling 04/03/23 1516   Line Status Blood return noted;Flushed 04/03/23 1516   Dressing Status Dry;Clean;Intact 04/03/23 1516   Dressing Intervention First dressing 04/03/23 1516     Infusions:  Heparin  Allergies: Review of patient's allergies indicates:  No Known Allergies   NPO: No    Anticoagulation:   Anticoagulants       Ordered     Route Frequency Start Stop    04/03/23 1702  heparin (PORCINE)  (LOW INTENSITY heparin infusion nomogram - [facility] (Recommended Indications: Acute Coronary Syndrome, Atrial Fibrillation, Prophylaxis in Prosthetic Heart Valves, and other indication where full anticoagulation is desired, bleeding risk is moderate, antiplatelet agents have been utilized, and time to therapeutic can be delayed minimizing the increased bleeding risk))         IV As needed (PRN) 04/03/23 1759 --    04/03/23 1702  heparin (PORCINE)  (LOW INTENSITY heparin infusion nomogram - [facility] (Recommended Indications: Acute Coronary Syndrome, Atrial Fibrillation, Prophylaxis in Prosthetic Heart Valves, and other indication where full anticoagulation is desired, bleeding risk is moderate, antiplatelet agents have been utilized, and time to therapeutic can be delayed minimizing the increased bleeding risk))         IV As needed (PRN) 04/03/23 1759 --    04/03/23 1702  heparin (PORCINE)  (LOW INTENSITY heparin infusion nomogram - [facility] (Recommended Indications: Acute  Coronary Syndrome, Atrial Fibrillation, Prophylaxis in Prosthetic Heart Valves, and other indication where full anticoagulation is desired, bleeding risk is moderate, antiplatelet agents have been utilized, and time to therapeutic can be delayed minimizing the increased bleeding risk))         IV Once 04/03/23 1700 --    04/03/23 1702  heparin (porcine) in D5W  (LOW INTENSITY heparin infusion nomogram - [facility] (Recommended Indications: Acute Coronary Syndrome, Atrial Fibrillation, Prophylaxis in Prosthetic Heart Valves, and other indication where full anticoagulation is desired, bleeding risk is moderate, antiplatelet agents have been utilized, and time to therapeutic can be delayed minimizing the increased bleeding risk))         IV Continuous 04/03/23 1700 --             Instructions      [facility]  Place in observation to Hospital Medicine  Upon patient arrival to floor, please contact Hospital Medicine on call.            Addendum:  After orders placed, I was notified by the ED physician that patient and family have changed their mind and wanted patient to be transferred to [facility] instead.  Patient accepted for ED to ED transfer to [facility].  Obs order to [facility], heparin gtt, labs canceled.

## 2023-04-03 NOTE — ED PROVIDER NOTES
Encounter Date: 4/3/2023       History     Chief Complaint   Patient presents with    Chest Pain     Chest pain began 2 weeks ago. Constant. Nausea.      The history is provided by the patient.   Chest Pain  The current episode started several weeks ago. Chest pain occurs constantly. The chest pain is unchanged. Primary symptoms include nausea. Pertinent negatives for primary symptoms include no fever, no shortness of breath, no cough, no palpitations, no abdominal pain, no vomiting and no dizziness.   Pertinent negatives for associated symptoms include no weakness.   Review of patient's allergies indicates:  No Known Allergies  Past Medical History:   Diagnosis Date    Coronary atherosclerosis 2019    Depression     GERD (gastroesophageal reflux disease)     High cholesterol     Hyperlipidemia     Hypertension     Hypothyroidism     Metabolic syndrome X 2019    MI (myocardial infarction)     Stroke     Vitamin D deficiency 2019     Past Surgical History:   Procedure Laterality Date    ANGIOPLASTY       SECTION      x 2    HYSTERECTOMY      INSERTION OF PACEMAKER      OOPHORECTOMY       History reviewed. No pertinent family history.  Social History     Tobacco Use    Smoking status: Never    Smokeless tobacco: Never   Substance Use Topics    Alcohol use: Yes     Comment: occasionally    Drug use: No     Review of Systems   Constitutional:  Negative for fever.   HENT:  Negative for sore throat.    Respiratory:  Negative for cough and shortness of breath.    Cardiovascular:  Positive for chest pain. Negative for palpitations.   Gastrointestinal:  Positive for nausea. Negative for abdominal pain and vomiting.   Genitourinary:  Negative for dysuria.   Musculoskeletal:  Negative for back pain.   Skin:  Negative for rash.   Neurological:  Negative for dizziness and weakness.   Hematological:  Does not bruise/bleed easily.     Physical Exam     Initial Vitals   BP Pulse Resp Temp SpO2   23 1503  04/03/23 1503 04/03/23 1503 04/03/23 1502 04/03/23 1503   (!) 142/76 (!) 50 18 97.7 °F (36.5 °C) 97 %      MAP       --                Physical Exam    Nursing note and vitals reviewed.  Constitutional: She appears well-developed and well-nourished. No distress.   HENT:   Head: Normocephalic and atraumatic.   Mouth/Throat: Oropharynx is clear and moist.   Eyes: Conjunctivae and EOM are normal. Pupils are equal, round, and reactive to light.   Neck: Neck supple.   Normal range of motion.  Cardiovascular:  Regular rhythm and normal heart sounds.   Bradycardia present.   Exam reveals no gallop and no friction rub.       No murmur heard.  Pulmonary/Chest: Breath sounds normal. No respiratory distress. She has no wheezes. She has no rhonchi. She has no rales.   Abdominal: Abdomen is soft. Bowel sounds are normal. She exhibits no distension and no mass. There is no abdominal tenderness. There is no rebound and no guarding.   Musculoskeletal:         General: No tenderness or edema. Normal range of motion.      Cervical back: Normal range of motion and neck supple.     Neurological: She is alert and oriented to person, place, and time. She has normal strength.   Skin: Skin is warm and dry. No rash noted.   Psychiatric: She has a normal mood and affect. Thought content normal.       ED Course   Procedures  Labs Reviewed   CBC W/ AUTO DIFFERENTIAL - Abnormal; Notable for the following components:       Result Value    Hemoglobin 11.9 (*)     MCH 26.5 (*)     Immature Grans (Abs) 0.05 (*)     All other components within normal limits   COMPREHENSIVE METABOLIC PANEL - Abnormal; Notable for the following components:    Anion Gap 7 (*)     All other components within normal limits   TROPONIN I - Abnormal; Notable for the following components:    Troponin I 0.393 (*)     All other components within normal limits   B-TYPE NATRIURETIC PEPTIDE   CK   URINALYSIS, REFLEX TO URINE CULTURE     EKG Readings: (Independently Interpreted)    Rhythm: Sinus Bradycardia. Heart Rate: 43. Ectopy: No Ectopy. Conduction: Normal. ST Segments: Normal ST Segments. T Waves: Normal. Clinical Impression: Normal Sinus Rhythm   ECG Results              EKG 12-lead (In process)  Result time 04/03/23 15:55:51      In process by Interface, Lab In [facility] (04/03/23 15:55:51)                   Narrative:    Test Reason : R07.9,    Vent. Rate : 043 BPM     Atrial Rate : 043 BPM     P-R Int : 172 ms          QRS Dur : 082 ms      QT Int : 476 ms       P-R-T Axes : 050 030 082 degrees     QTc Int : 402 ms    Marked sinus bradycardia  Nonspecific T wave abnormality  Abnormal ECG  When compared with ECG of 06-MAR-2020 10:00,  Previous ECG has undetermined rhythm, needs review  T wave inversion now evident in Anterior leads    Referred By: [email or web address]   SELF           Confirmed By:                                   Imaging Results              X-Ray Chest AP Portable (Final result)  Result time 04/03/23 15:44:43      Final result by MEJIA Valadez Sr., MD (04/03/23 15:44:43)                   Impression:      1. There is no focal pulmonary infiltrate visualized.  2. The size of the heart is prominent.  This may be secondary to magnification.  .      Electronically signed by: Jordon Valadez MD  Date:    04/03/2023  Time:    15:44               Narrative:    EXAMINATION:  XR CHEST AP PORTABLE    CLINICAL HISTORY:  chest pain;    COMPARISON:  03/05/2020    FINDINGS:  The size of the heart is prominent.  There is no focal pulmonary infiltrate visualized.  There is no pneumothorax.  The costophrenic angles are sharp.                                       Medications - No data to display    Medical Decision Making:   Initial Assessment:   Chest pain for the last two weeks.  Pacemaker placed that the [facility]  Differential Diagnosis:   Chest pain, NSTEMI, Pacemaker malfunction  Clinical Tests:   Lab Tests: Reviewed and Ordered  The following lab test(s) were unremarkable: CBC, CMP,  Urinalysis and Troponin  Radiological Study: Ordered and Reviewed  Medical Tests: Ordered and Reviewed  ED Management:  Labs and imaging reviewed by me.  No acute findings except for troponin of 0.393.  Considered admission, but all of patient's care is at the [facility] including cardiologist that placed the pacemaker.  Will call [facility] for transfer.    [facility] at capacity, and cannot accept transfer in.    SPoke with Dr. Houston at [facility] via transfer center.  Will accept patient in transfer.                        Clinical Impression:   Final diagnoses:  [R07.9] Chest pain  [R77.8] Elevated troponin (Primary)        ED Disposition Condition    Transfer to Another Facility Stable                Mauricio Hwang MD  04/03/23 1048

## 2023-04-03 NOTE — Clinical Note
Diagnosis: Chest pain [287411]   Future Attending Provider: CALEB MENDEZ [[id number]]   Is the patient being admitted to ED TeleObservation?: No   Admitting Provider:: CALEB MENDEZ [[id number]]

## 2023-05-10 RX ORDER — TRAMADOL HYDROCHLORIDE 50 MG/1
50 TABLET ORAL DAILY PRN
COMMUNITY

## 2023-05-10 RX ORDER — SODIUM, POTASSIUM,MAG SULFATES 17.5-3.13G
1 SOLUTION, RECONSTITUTED, ORAL ORAL ONCE
COMMUNITY

## 2023-05-10 RX ORDER — BISACODYL 5 MG
5 TABLET, DELAYED RELEASE (ENTERIC COATED) ORAL ONCE
COMMUNITY

## 2023-05-10 RX ORDER — CITALOPRAM 20 MG/1
20 TABLET, FILM COATED ORAL
COMMUNITY

## 2023-05-10 RX ORDER — DIAZEPAM 10 MG/1
10 TABLET ORAL
Status: ON HOLD | COMMUNITY
End: 2023-05-17 | Stop reason: CLARIF

## 2023-05-17 ENCOUNTER — ANESTHESIA (OUTPATIENT)
Dept: ENDOSCOPY | Facility: HOSPITAL | Age: 58
End: 2023-05-17
Payer: MEDICAID

## 2023-05-17 ENCOUNTER — ANESTHESIA EVENT (OUTPATIENT)
Dept: ENDOSCOPY | Facility: HOSPITAL | Age: 58
End: 2023-05-17
Payer: MEDICAID

## 2023-05-17 ENCOUNTER — HOSPITAL ENCOUNTER (OUTPATIENT)
Facility: HOSPITAL | Age: 58
Discharge: HOME | End: 2023-05-17
Attending: INTERNAL MEDICINE | Admitting: INTERNAL MEDICINE
Payer: MEDICAID

## 2023-05-17 VITALS
BODY MASS INDEX: 32.43 KG/M2 | WEIGHT: 183 LBS | OXYGEN SATURATION: 96 % | HEIGHT: 63 IN | DIASTOLIC BLOOD PRESSURE: 70 MMHG | TEMPERATURE: 99 F | HEART RATE: 44 BPM | SYSTOLIC BLOOD PRESSURE: 140 MMHG | RESPIRATION RATE: 16 BRPM

## 2023-05-17 DIAGNOSIS — R13.19 CONSTANT LOW-GRADE DYSPHAGIA: Primary | ICD-10-CM

## 2023-05-17 DIAGNOSIS — Z86.010 PERSONAL HISTORY OF COLONIC POLYPS: ICD-10-CM

## 2023-05-17 PROBLEM — Z86.0100 PERSONAL HISTORY OF COLONIC POLYPS: Status: ACTIVE | Noted: 2023-05-17

## 2023-05-17 PROCEDURE — 00813 ANES UPR LWR GI NDSC PX: CPT | Performed by: INTERNAL MEDICINE

## 2023-05-17 PROCEDURE — 43239 EGD BIOPSY SINGLE/MULTIPLE: CPT | Performed by: INTERNAL MEDICINE

## 2023-05-17 PROCEDURE — 25000003 PHARM REV CODE 250: Performed by: NURSE ANESTHETIST, CERTIFIED REGISTERED

## 2023-05-17 PROCEDURE — 45380 COLONOSCOPY AND BIOPSY: CPT | Mod: 59 | Performed by: INTERNAL MEDICINE

## 2023-05-17 PROCEDURE — C1769 GUIDE WIRE: HCPCS | Performed by: INTERNAL MEDICINE

## 2023-05-17 PROCEDURE — 88305 TISSUE EXAM BY PATHOLOGIST: CPT | Performed by: PATHOLOGY

## 2023-05-17 PROCEDURE — 37000008 HC ANESTHESIA 1ST 15 MINUTES: Performed by: INTERNAL MEDICINE

## 2023-05-17 PROCEDURE — 27201012 HC FORCEPS, HOT/COLD, DISP: Performed by: INTERNAL MEDICINE

## 2023-05-17 PROCEDURE — 88305 TISSUE EXAM BY PATHOLOGIST: ICD-10-PCS | Mod: 26,,, | Performed by: PATHOLOGY

## 2023-05-17 PROCEDURE — 43248 EGD GUIDE WIRE INSERTION: CPT | Mod: 59 | Performed by: INTERNAL MEDICINE

## 2023-05-17 PROCEDURE — 27201089 HC SNARE, DISP (ANY): Performed by: INTERNAL MEDICINE

## 2023-05-17 PROCEDURE — 37000009 HC ANESTHESIA EA ADD 15 MINS: Performed by: INTERNAL MEDICINE

## 2023-05-17 PROCEDURE — 88305 TISSUE EXAM BY PATHOLOGIST: CPT | Mod: 26,,, | Performed by: PATHOLOGY

## 2023-05-17 PROCEDURE — 63600175 PHARM REV CODE 636 W HCPCS: Performed by: NURSE ANESTHETIST, CERTIFIED REGISTERED

## 2023-05-17 PROCEDURE — 45385 COLONOSCOPY W/LESION REMOVAL: CPT | Performed by: INTERNAL MEDICINE

## 2023-05-17 RX ORDER — PROPOFOL 10 MG/ML
VIAL (ML) INTRAVENOUS
Status: DISCONTINUED | OUTPATIENT
Start: 2023-05-17 | End: 2023-05-17

## 2023-05-17 RX ORDER — LIDOCAINE HYDROCHLORIDE 10 MG/ML
INJECTION, SOLUTION EPIDURAL; INFILTRATION; INTRACAUDAL; PERINEURAL
Status: DISCONTINUED | OUTPATIENT
Start: 2023-05-17 | End: 2023-05-17

## 2023-05-17 RX ADMIN — PROPOFOL 50 MG: 10 INJECTION, EMULSION INTRAVENOUS at 10:05

## 2023-05-17 RX ADMIN — LIDOCAINE HYDROCHLORIDE 50 MG: 10 INJECTION, SOLUTION EPIDURAL; INFILTRATION; INTRACAUDAL; PERINEURAL at 10:05

## 2023-05-17 RX ADMIN — PROPOFOL 100 MG: 10 INJECTION, EMULSION INTRAVENOUS at 10:05

## 2023-05-17 RX ADMIN — SODIUM CHLORIDE, SODIUM LACTATE, POTASSIUM CHLORIDE, AND CALCIUM CHLORIDE: .6; .31; .03; .02 INJECTION, SOLUTION INTRAVENOUS at 09:05

## 2023-05-17 NOTE — PROVATION PATIENT INSTRUCTIONS
Discharge Summary/Instructions after an Endoscopic Procedure  Patient Name: Scooter Ochoa  Patient MRN: [id number]  Patient YOB: 1965  Wednesday, May 17, 2023 Jordon uD MD  Dear patient,  As a result of recent federal legislation (The Federal Cures Act), you may   receive lab or pathology results from your procedure in your [facility]   account before your physician is able to contact you. Your physician or   their representative will relay the results to you with their   recommendations at their soonest availability.  Thank you,  RESTRICTIONS:  During your procedure today, you received medications for sedation.  These   medications may affect your judgment, balance and coordination.  Therefore,   for 24 hours, you have the following restrictions:   - DO NOT drive a car, operate machinery, make legal/financial decisions,   sign important papers or drink alcohol.    ACTIVITY:  Today: no heavy lifting, straining or running due to procedural   sedation/anesthesia.  The following day: return to full activity including work.  DIET:  Eat and drink normally unless instructed otherwise.     TREATMENT FOR COMMON SIDE EFFECTS:  - Mild abdominal pain, nausea, belching, bloating or excessive gas:  rest,   eat lightly and use a heating pad.  - Sore Throat: treat with throat lozenges and/or gargle with warm salt   water.  - Because air was used during the procedure, expelling large amounts of air   from your rectum or belching is normal.  - If a bowel prep was taken, you may not have a bowel movement for 1-3 days.    This is normal.  SYMPTOMS TO WATCH FOR AND REPORT TO YOUR PHYSICIAN:  1. Abdominal pain or bloating, other than gas cramps.  2. Chest pain.  3. Back pain.  4. Signs of infection such as: chills or fever occurring within 24 hours   after the procedure.  5. Rectal bleeding, which would show as bright red, maroon, or black stools.   (A tablespoon of blood from the rectum is not serious, especially  if   hemorrhoids are present.)  6. Vomiting.  7. Weakness or dizziness.  GO DIRECTLY TO THE NEAREST EMERGENCY ROOM IF YOU HAVE ANY OF THE FOLLOWING:      Difficulty breathing              Chills and/or fever over 101 F   Persistent vomiting and/or vomiting blood   Severe abdominal pain   Severe chest pain   Black, tarry stools   Bleeding- more than one tablespoon   Any other symptom or condition that you feel may need urgent attention  Your doctor recommends these additional instructions:  If any biopsies were taken, your doctors clinic will contact you in 1 to 2   weeks with any results.  - Discharge patient to home (ambulatory).   - Resume previous diet today.   - Continue present medications.   - Return to my office in 2 weeks.  For questions, problems or results please call your physician Jordon Du MD at Work:  [phone]  If you have any questions about the above instructions, call the GI   department at [phone] or call the endoscopy unit at [phone]   from 7am until 3 pm.  [facility] [place], EMERGENCY ROOM PHONE NUMBER:   [phone]  IF A COMPLICATION OR EMERGENCY SITUATION ARISES AND YOU ARE UNABLE TO REACH   YOUR PHYSICIAN - GO DIRECTLY TO THE EMERGENCY ROOM.  I have read or have had read to me these discharge instructions for my   procedure and have received a written copy.  I understand these   instructions and will follow-up with my physician if I have any questions.     __________________________________       _____________________________________  Nurse Signature                                          Patient/Designated   Responsible Party Signature  MD Jordon Herman MD  5/17/2023 10:19:25 AM  This report has been verified and signed electronically.  Dear patient,  As a result of recent federal legislation (The Federal Cures Act), you may   receive lab or pathology results from your procedure in your [facility]   account before  your physician is able to contact you. Your physician or   their representative will relay the results to you with their   recommendations at their soonest availability.  Thank you,  PROVATION

## 2023-05-17 NOTE — ANESTHESIA POSTPROCEDURE EVALUATION
Anesthesia Post Evaluation    Patient: Scooter Ochoa    Procedure(s) Performed: Procedure(s) (LRB):  EGD (ESOPHAGOGASTRODUODENOSCOPY) (N/A)  COLONOSCOPY (N/A)    Final Anesthesia Type: MAC      Patient location during evaluation: GI PACU  Patient participation: Yes- Able to Participate  Level of consciousness: awake and alert  Post-procedure vital signs: reviewed and stable  Pain management: adequate  Airway patency: patent    PONV status at discharge: No PONV  Anesthetic complications: no      Cardiovascular status: blood pressure returned to baseline  Respiratory status: unassisted and spontaneous ventilation  Hydration status: euvolemic  Follow-up not needed.          Vitals Value Taken Time   /70 05/17/23 1110   Temp 37 °C (98.6 °F) 05/17/23 1050   Pulse 44 05/17/23 1110   Resp 16 05/17/23 1110   SpO2 96 % 05/17/23 1110         Event Time   Out of Recovery 11:12:12         Pain/Osmar Score: Osmar Score: 10 (5/17/2023 11:10 AM)

## 2023-05-17 NOTE — H&P
I have reviewed the history, medications and indications for procedure performed within the past 30 days.     ASA: per anesthesia.  Airway assessment: per anesthesia.    History and Physical reviewed, patient examined, no changes.      Jordon Du

## 2023-05-17 NOTE — PROVATION PATIENT INSTRUCTIONS
Discharge Summary/Instructions after an Endoscopic Procedure  Patient Name: Scooter Ochoa  Patient MRN: [id number]  Patient YOB: 1965  Wednesday, May 17, 2023 Jordon Du MD  Dear patient,  As a result of recent federal legislation (The Federal Cures Act), you may   receive lab or pathology results from your procedure in your [facility]   account before your physician is able to contact you. Your physician or   their representative will relay the results to you with their   recommendations at their soonest availability.  Thank you,  RESTRICTIONS:  During your procedure today, you received medications for sedation.  These   medications may affect your judgment, balance and coordination.  Therefore,   for 24 hours, you have the following restrictions:   - DO NOT drive a car, operate machinery, make legal/financial decisions,   sign important papers or drink alcohol.    ACTIVITY:  Today: no heavy lifting, straining or running due to procedural   sedation/anesthesia.  The following day: return to full activity including work.  DIET:  Eat and drink normally unless instructed otherwise.     TREATMENT FOR COMMON SIDE EFFECTS:  - Mild abdominal pain, nausea, belching, bloating or excessive gas:  rest,   eat lightly and use a heating pad.  - Sore Throat: treat with throat lozenges and/or gargle with warm salt   water.  - Because air was used during the procedure, expelling large amounts of air   from your rectum or belching is normal.  - If a bowel prep was taken, you may not have a bowel movement for 1-3 days.    This is normal.  SYMPTOMS TO WATCH FOR AND REPORT TO YOUR PHYSICIAN:  1. Abdominal pain or bloating, other than gas cramps.  2. Chest pain.  3. Back pain.  4. Signs of infection such as: chills or fever occurring within 24 hours   after the procedure.  5. Rectal bleeding, which would show as bright red, maroon, or black stools.   (A tablespoon of blood from the rectum is not serious, especially  if   hemorrhoids are present.)  6. Vomiting.  7. Weakness or dizziness.  GO DIRECTLY TO THE NEAREST EMERGENCY ROOM IF YOU HAVE ANY OF THE FOLLOWING:      Difficulty breathing              Chills and/or fever over 101 F   Persistent vomiting and/or vomiting blood   Severe abdominal pain   Severe chest pain   Black, tarry stools   Bleeding- more than one tablespoon   Any other symptom or condition that you feel may need urgent attention  Your doctor recommends these additional instructions:  If any biopsies were taken, your doctors clinic will contact you in 1 to 2   weeks with any results.  - Discharge patient to home (ambulatory).   - Resume previous diet today.   - Continue present medications.   - Repeat colonoscopy in 5 years for surveillance.   - Return to GI office in 2 weeks.   - Patient has a contact number available for emergencies.  The signs and   symptoms of potential delayed complications were discussed with the   patient.  Return to normal activities tomorrow.  Written discharge   instructions were provided to the patient.   - The signs and symptoms of potential delayed complications were discussed   with the patient.   - Return patient to referring hospital for observation.   - Patient has a contact number available for emergencies.  The signs and   symptoms of potential delayed complications were discussed with the   patient.  Return to normal activities tomorrow.  Written discharge   instructions were provided to the patient.   - Patient has a contact number available for emergencies.   - Return patient to referring hospital for observation.  For questions, problems or results please call your physician Jordon Du MD at Work:  [phone]  If you have any questions about the above instructions, call the GI   department at [phone] or call the endoscopy unit at [phone]   from 7am until 3 pm.  [facility] [place], EMERGENCY ROOM PHONE NUMBER:    [phone]  IF A COMPLICATION OR EMERGENCY SITUATION ARISES AND YOU ARE UNABLE TO REACH   YOUR PHYSICIAN - GO DIRECTLY TO THE EMERGENCY ROOM.  I have read or have had read to me these discharge instructions for my   procedure and have received a written copy.  I understand these   instructions and will follow-up with my physician if I have any questions.     __________________________________       _____________________________________  Nurse Signature                                          Patient/Designated   Responsible Party Signature  MD Jordon Herman MD  5/17/2023 10:54:47 AM  This report has been verified and signed electronically.  Dear patient,  As a result of recent federal legislation (The Federal Cures Act), you may   receive lab or pathology results from your procedure in your [facility]   account before your physician is able to contact you. Your physician or   their representative will relay the results to you with their   recommendations at their soonest availability.  Thank you,  PROVATION

## 2023-05-17 NOTE — TRANSFER OF CARE
"Anesthesia Transfer of Care Note    Patient: Scooter Ochoa    Procedure(s) Performed: Procedure(s) (LRB):  EGD (ESOPHAGOGASTRODUODENOSCOPY) (N/A)  COLONOSCOPY (N/A)    Patient location: GI    Anesthesia Type: MAC    Transport from OR: Transported from OR on room air with adequate spontaneous ventilation    Post pain: adequate analgesia    Post assessment: no apparent anesthetic complications    Post vital signs: stable    Level of consciousness: sedated    Nausea/Vomiting: no nausea/vomiting    Complications: none    Transfer of care protocol was followed      Last vitals:   Visit Vitals  /64   Pulse (!) 49   Temp 36.6 °C (97.9 °F)   Resp 18   Ht 5' 3" (1.6 m)   Wt 83 kg (183 lb)   LMP  (LMP Unknown)   SpO2 98%   Breastfeeding No   BMI 32.42 kg/m²     "

## 2023-05-17 NOTE — ANESTHESIA PREPROCEDURE EVALUATION
05/17/2023  Scooter Ochoa is a 57 y.o., female.      Pre-op Assessment    I have reviewed the Patient Summary Reports.    I have reviewed the NPO Status.   I have reviewed the Medications.     Review of Systems  Cardiovascular:   Pacemaker (PM) Hypertension, well controlled Past MI CAD  CABG/stent (Stent)  hyperlipidemia ECG has been reviewed. Marked sinus bradycardia   Nonspecific T wave abnormality   Abnormal ECG   When compared with ECG of 06-MAR-2020 10:00,   T wave inversion now evident in Anterior leads   Confirmed by MD GEETA, GILES ([id number]) on 4/5/2023 6:49:34 PM    Hepatic/GI:   GERD, well controlled Dysphagia   Neurological:   TIA,    Endocrine:   Hypothyroidism Graves disease Obesity / BMI > 30  Psych:   Psychiatric History depression          Physical Exam  General: Well nourished    Airway:  Mallampati: II   Mouth Opening: Normal  TM Distance: Normal  Tongue: Normal  Neck ROM: Normal ROM    Dental:  Intact    Chest/Lungs:  Normal Respiratory Rate    Heart:  Rate: Normal  Rhythm: Regular Rhythm        Anesthesia Plan  Type of Anesthesia, risks & benefits discussed:    Anesthesia Type: MAC  Intra-op Monitoring Plan: Standard ASA Monitors  Post Op Pain Control Plan: multimodal analgesia  Induction:  IV  Informed Consent: Informed consent signed with the Patient and all parties understand the risks and agree with anesthesia plan.  All questions answered.   ASA Score: 3  Day of Surgery Review of History & Physical: H&P Update referred to the surgeon/provider.I have interviewed and examined the patient. I have reviewed the patient's H&P dated: There are no significant changes.     Ready For Surgery From Anesthesia Perspective.     .

## 2023-05-22 LAB
FINAL PATHOLOGIC DIAGNOSIS: NORMAL
GROSS: NORMAL
Lab: NORMAL

## 2024-07-26 ENCOUNTER — HOSPITAL ENCOUNTER (OUTPATIENT)
Dept: RADIOLOGY | Facility: HOSPITAL | Age: 59
Discharge: HOME | End: 2024-07-26
Attending: INTERNAL MEDICINE
Payer: MEDICAID

## 2024-07-26 DIAGNOSIS — M25.512 ACUTE PAIN OF LEFT SHOULDER: ICD-10-CM

## 2024-07-26 DIAGNOSIS — M25.512 PAIN IN LEFT SHOULDER: ICD-10-CM

## 2024-07-26 DIAGNOSIS — M25.511 PAIN IN RIGHT SHOULDER: ICD-10-CM

## 2024-07-26 DIAGNOSIS — M25.511 ACUTE PAIN OF RIGHT SHOULDER: ICD-10-CM

## 2024-07-26 PROCEDURE — 73030 X-RAY EXAM OF SHOULDER: CPT | Mod: TC,50,PO

## 2024-07-26 PROCEDURE — 73030 X-RAY EXAM OF SHOULDER: CPT | Mod: 26,50,, | Performed by: RADIOLOGY

## 2025-08-07 ENCOUNTER — HOSPITAL ENCOUNTER (EMERGENCY)
Facility: HOSPITAL | Age: 60
Discharge: ANOTHER HOSPITAL | End: 2025-08-07
Attending: EMERGENCY MEDICINE
Payer: MEDICAID

## 2025-08-07 VITALS
OXYGEN SATURATION: 95 % | SYSTOLIC BLOOD PRESSURE: 144 MMHG | BODY MASS INDEX: 34.02 KG/M2 | WEIGHT: 192 LBS | DIASTOLIC BLOOD PRESSURE: 67 MMHG | HEART RATE: 44 BPM | RESPIRATION RATE: 20 BRPM | TEMPERATURE: 98 F | HEIGHT: 63 IN

## 2025-08-07 DIAGNOSIS — R07.9 CHEST PAIN: ICD-10-CM

## 2025-08-07 DIAGNOSIS — R00.1 SYMPTOMATIC BRADYCARDIA: Primary | ICD-10-CM

## 2025-08-07 LAB
ABSOLUTE EOSINOPHIL (OHS): 0.18 K/UL
ABSOLUTE MONOCYTE (OHS): 0.72 K/UL (ref 0.3–1)
ABSOLUTE NEUTROPHIL COUNT (OHS): 5.38 K/UL (ref 1.8–7.7)
ALBUMIN SERPL BCP-MCNC: 4 G/DL (ref 3.5–5.2)
ALP SERPL-CCNC: 63 UNIT/L (ref 40–150)
ALT SERPL W/O P-5'-P-CCNC: 10 UNIT/L (ref 10–44)
ANION GAP (OHS): 11 MMOL/L (ref 8–16)
AST SERPL-CCNC: 17 UNIT/L (ref 11–45)
BASOPHILS # BLD AUTO: 0.03 K/UL
BASOPHILS NFR BLD AUTO: 0.3 %
BILIRUB SERPL-MCNC: 0.4 MG/DL (ref 0.1–1)
BUN SERPL-MCNC: 15 MG/DL (ref 6–20)
CALCIUM SERPL-MCNC: 9.5 MG/DL (ref 8.7–10.5)
CHLORIDE SERPL-SCNC: 105 MMOL/L (ref 95–110)
CO2 SERPL-SCNC: 24 MMOL/L (ref 23–29)
CREAT SERPL-MCNC: 0.8 MG/DL (ref 0.5–1.4)
CTP QC/QA: YES
CTP QC/QA: YES
ERYTHROCYTE [DISTWIDTH] IN BLOOD BY AUTOMATED COUNT: 13.9 % (ref 11.5–14.5)
GFR SERPLBLD CREATININE-BSD FMLA CKD-EPI: >60 ML/MIN/1.73/M2
GLUCOSE SERPL-MCNC: 88 MG/DL (ref 70–110)
HCT VFR BLD AUTO: 39 % (ref 37–48.5)
HGB BLD-MCNC: 12 GM/DL (ref 12–16)
IMM GRANULOCYTES # BLD AUTO: 0.04 K/UL (ref 0–0.04)
IMM GRANULOCYTES NFR BLD AUTO: 0.4 % (ref 0–0.5)
LYMPHOCYTES # BLD AUTO: 2.94 K/UL (ref 1–4.8)
MCH RBC QN AUTO: 25.9 PG (ref 27–31)
MCHC RBC AUTO-ENTMCNC: 30.8 G/DL (ref 32–36)
MCV RBC AUTO: 84 FL (ref 82–98)
NT-PROBNP SERPL-MCNC: 223 PG/ML
NUCLEATED RBC (/100WBC) (OHS): 0 /100 WBC
OHS QRS DURATION: 156 MS
OHS QRS DURATION: 78 MS
OHS QTC CALCULATION: 441 MS
OHS QTC CALCULATION: 486 MS
PLATELET # BLD AUTO: 266 K/UL (ref 150–450)
PMV BLD AUTO: 10.5 FL (ref 9.2–12.9)
POC MOLECULAR INFLUENZA A AGN: NEGATIVE
POC MOLECULAR INFLUENZA B AGN: NEGATIVE
POTASSIUM SERPL-SCNC: 4.1 MMOL/L (ref 3.5–5.1)
PROT SERPL-MCNC: 7.6 GM/DL (ref 6–8.4)
RBC # BLD AUTO: 4.63 M/UL (ref 4–5.4)
RELATIVE EOSINOPHIL (OHS): 1.9 %
RELATIVE LYMPHOCYTE (OHS): 31.6 % (ref 18–48)
RELATIVE MONOCYTE (OHS): 7.8 % (ref 4–15)
RELATIVE NEUTROPHIL (OHS): 58 % (ref 38–73)
SARS-COV-2 RDRP RESP QL NAA+PROBE: NEGATIVE
SODIUM SERPL-SCNC: 140 MMOL/L (ref 136–145)
TROPONIN I SERPL HS-MCNC: <3 NG/L
WBC # BLD AUTO: 9.29 K/UL (ref 3.9–12.7)

## 2025-08-07 PROCEDURE — 63600175 PHARM REV CODE 636 W HCPCS: Mod: ER | Performed by: EMERGENCY MEDICINE

## 2025-08-07 PROCEDURE — 82040 ASSAY OF SERUM ALBUMIN: CPT | Mod: ER | Performed by: EMERGENCY MEDICINE

## 2025-08-07 PROCEDURE — 96375 TX/PRO/DX INJ NEW DRUG ADDON: CPT | Mod: ER

## 2025-08-07 PROCEDURE — 93005 ELECTROCARDIOGRAM TRACING: CPT | Mod: ER

## 2025-08-07 PROCEDURE — 93010 ELECTROCARDIOGRAM REPORT: CPT | Mod: ,,, | Performed by: INTERNAL MEDICINE

## 2025-08-07 PROCEDURE — 83880 ASSAY OF NATRIURETIC PEPTIDE: CPT | Mod: ER | Performed by: EMERGENCY MEDICINE

## 2025-08-07 PROCEDURE — 99285 EMERGENCY DEPT VISIT HI MDM: CPT | Mod: 25,ER

## 2025-08-07 PROCEDURE — 96376 TX/PRO/DX INJ SAME DRUG ADON: CPT | Mod: ER

## 2025-08-07 PROCEDURE — 84484 ASSAY OF TROPONIN QUANT: CPT | Mod: ER | Performed by: EMERGENCY MEDICINE

## 2025-08-07 PROCEDURE — 87502 INFLUENZA DNA AMP PROBE: CPT | Mod: ER

## 2025-08-07 PROCEDURE — 85025 COMPLETE CBC W/AUTO DIFF WBC: CPT | Mod: ER | Performed by: EMERGENCY MEDICINE

## 2025-08-07 PROCEDURE — 96374 THER/PROPH/DIAG INJ IV PUSH: CPT | Mod: ER

## 2025-08-07 PROCEDURE — 87635 SARS-COV-2 COVID-19 AMP PRB: CPT | Mod: ER | Performed by: EMERGENCY MEDICINE

## 2025-08-07 RX ORDER — ONDANSETRON HYDROCHLORIDE 2 MG/ML
4 INJECTION, SOLUTION INTRAVENOUS
Status: COMPLETED | OUTPATIENT
Start: 2025-08-07 | End: 2025-08-07

## 2025-08-07 RX ORDER — MORPHINE SULFATE 4 MG/ML
4 INJECTION, SOLUTION INTRAMUSCULAR; INTRAVENOUS
Status: COMPLETED | OUTPATIENT
Start: 2025-08-07 | End: 2025-08-07

## 2025-08-07 RX ADMIN — MORPHINE SULFATE 4 MG: 4 INJECTION INTRAVENOUS at 01:08

## 2025-08-07 RX ADMIN — ONDANSETRON 4 MG: 2 INJECTION INTRAMUSCULAR; INTRAVENOUS at 01:08

## 2025-08-07 RX ADMIN — ONDANSETRON 4 MG: 2 INJECTION INTRAMUSCULAR; INTRAVENOUS at 06:08
